# Patient Record
Sex: FEMALE | Race: BLACK OR AFRICAN AMERICAN | NOT HISPANIC OR LATINO
[De-identification: names, ages, dates, MRNs, and addresses within clinical notes are randomized per-mention and may not be internally consistent; named-entity substitution may affect disease eponyms.]

---

## 2019-03-28 ENCOUNTER — TRANSCRIPTION ENCOUNTER (OUTPATIENT)
Age: 49
End: 2019-03-28

## 2020-02-03 ENCOUNTER — INPATIENT (INPATIENT)
Facility: HOSPITAL | Age: 50
LOS: 0 days | Discharge: HOME | End: 2020-02-04
Attending: ORTHOPAEDIC SURGERY | Admitting: ORTHOPAEDIC SURGERY
Payer: MEDICAID

## 2020-02-03 VITALS
DIASTOLIC BLOOD PRESSURE: 86 MMHG | TEMPERATURE: 98 F | SYSTOLIC BLOOD PRESSURE: 155 MMHG | WEIGHT: 220.02 LBS | OXYGEN SATURATION: 98 % | HEIGHT: 69 IN | HEART RATE: 97 BPM | RESPIRATION RATE: 22 BRPM

## 2020-02-03 DIAGNOSIS — Z98.890 OTHER SPECIFIED POSTPROCEDURAL STATES: Chronic | ICD-10-CM

## 2020-02-03 LAB
ANION GAP SERPL CALC-SCNC: 6 MMOL/L — SIGNIFICANT CHANGE UP (ref 5–17)
APTT BLD: 33.4 SEC — SIGNIFICANT CHANGE UP (ref 27.5–36.3)
BUN SERPL-MCNC: 6 MG/DL — LOW (ref 7–23)
CALCIUM SERPL-MCNC: 9 MG/DL — SIGNIFICANT CHANGE UP (ref 8.5–10.1)
CHLORIDE SERPL-SCNC: 107 MMOL/L — SIGNIFICANT CHANGE UP (ref 96–108)
CO2 SERPL-SCNC: 27 MMOL/L — SIGNIFICANT CHANGE UP (ref 22–31)
CREAT SERPL-MCNC: 1.08 MG/DL — SIGNIFICANT CHANGE UP (ref 0.5–1.3)
GLUCOSE SERPL-MCNC: 113 MG/DL — HIGH (ref 70–99)
HCG SERPL-ACNC: <1 MIU/ML — SIGNIFICANT CHANGE UP
HCG UR QL: NEGATIVE — SIGNIFICANT CHANGE UP
HCT VFR BLD CALC: 38 % — SIGNIFICANT CHANGE UP (ref 34.5–45)
HGB BLD-MCNC: 12.7 G/DL — SIGNIFICANT CHANGE UP (ref 11.5–15.5)
INR BLD: 1.03 RATIO — SIGNIFICANT CHANGE UP (ref 0.88–1.16)
MCHC RBC-ENTMCNC: 28.2 PG — SIGNIFICANT CHANGE UP (ref 27–34)
MCHC RBC-ENTMCNC: 33.4 GM/DL — SIGNIFICANT CHANGE UP (ref 32–36)
MCV RBC AUTO: 84.3 FL — SIGNIFICANT CHANGE UP (ref 80–100)
NRBC # BLD: 0 /100 WBCS — SIGNIFICANT CHANGE UP (ref 0–0)
PLATELET # BLD AUTO: 323 K/UL — SIGNIFICANT CHANGE UP (ref 150–400)
POTASSIUM SERPL-MCNC: 4.3 MMOL/L — SIGNIFICANT CHANGE UP (ref 3.5–5.3)
POTASSIUM SERPL-SCNC: 4.3 MMOL/L — SIGNIFICANT CHANGE UP (ref 3.5–5.3)
PROTHROM AB SERPL-ACNC: 11.6 SEC — SIGNIFICANT CHANGE UP (ref 10–12.9)
RBC # BLD: 4.51 M/UL — SIGNIFICANT CHANGE UP (ref 3.8–5.2)
RBC # FLD: 13.6 % — SIGNIFICANT CHANGE UP (ref 10.3–14.5)
SODIUM SERPL-SCNC: 140 MMOL/L — SIGNIFICANT CHANGE UP (ref 135–145)
WBC # BLD: 9.99 K/UL — SIGNIFICANT CHANGE UP (ref 3.8–10.5)
WBC # FLD AUTO: 9.99 K/UL — SIGNIFICANT CHANGE UP (ref 3.8–10.5)

## 2020-02-03 PROCEDURE — 99223 1ST HOSP IP/OBS HIGH 75: CPT

## 2020-02-03 PROCEDURE — 93010 ELECTROCARDIOGRAM REPORT: CPT

## 2020-02-03 PROCEDURE — 73590 X-RAY EXAM OF LOWER LEG: CPT | Mod: 26,LT,77

## 2020-02-03 PROCEDURE — 73630 X-RAY EXAM OF FOOT: CPT | Mod: 26,LT

## 2020-02-03 PROCEDURE — 71045 X-RAY EXAM CHEST 1 VIEW: CPT | Mod: 26

## 2020-02-03 PROCEDURE — 73590 X-RAY EXAM OF LOWER LEG: CPT | Mod: 26,LT

## 2020-02-03 PROCEDURE — 99285 EMERGENCY DEPT VISIT HI MDM: CPT

## 2020-02-03 PROCEDURE — 73700 CT LOWER EXTREMITY W/O DYE: CPT | Mod: 26,LT

## 2020-02-03 PROCEDURE — 73610 X-RAY EXAM OF ANKLE: CPT | Mod: 26,LT

## 2020-02-03 RX ORDER — OXYCODONE AND ACETAMINOPHEN 5; 325 MG/1; MG/1
1 TABLET ORAL ONCE
Refills: 0 | Status: DISCONTINUED | OUTPATIENT
Start: 2020-02-03 | End: 2020-02-03

## 2020-02-03 RX ORDER — MORPHINE SULFATE 50 MG/1
4 CAPSULE, EXTENDED RELEASE ORAL ONCE
Refills: 0 | Status: DISCONTINUED | OUTPATIENT
Start: 2020-02-03 | End: 2020-02-03

## 2020-02-03 RX ORDER — OXYCODONE HYDROCHLORIDE 5 MG/1
5 TABLET ORAL EVERY 4 HOURS
Refills: 0 | Status: DISCONTINUED | OUTPATIENT
Start: 2020-02-03 | End: 2020-02-04

## 2020-02-03 RX ORDER — TRAMADOL HYDROCHLORIDE 50 MG/1
50 TABLET ORAL EVERY 6 HOURS
Refills: 0 | Status: DISCONTINUED | OUTPATIENT
Start: 2020-02-03 | End: 2020-02-04

## 2020-02-03 RX ORDER — OXYCODONE HYDROCHLORIDE 5 MG/1
10 TABLET ORAL EVERY 4 HOURS
Refills: 0 | Status: DISCONTINUED | OUTPATIENT
Start: 2020-02-03 | End: 2020-02-04

## 2020-02-03 RX ORDER — HEPARIN SODIUM 5000 [USP'U]/ML
5000 INJECTION INTRAVENOUS; SUBCUTANEOUS EVERY 8 HOURS
Refills: 0 | Status: DISCONTINUED | OUTPATIENT
Start: 2020-02-03 | End: 2020-02-04

## 2020-02-03 RX ORDER — ACETAMINOPHEN 500 MG
650 TABLET ORAL EVERY 6 HOURS
Refills: 0 | Status: DISCONTINUED | OUTPATIENT
Start: 2020-02-03 | End: 2020-02-04

## 2020-02-03 RX ORDER — HYDROMORPHONE HYDROCHLORIDE 2 MG/ML
1 INJECTION INTRAMUSCULAR; INTRAVENOUS; SUBCUTANEOUS
Refills: 0 | Status: DISCONTINUED | OUTPATIENT
Start: 2020-02-03 | End: 2020-02-04

## 2020-02-03 RX ORDER — SENNA PLUS 8.6 MG/1
2 TABLET ORAL AT BEDTIME
Refills: 0 | Status: DISCONTINUED | OUTPATIENT
Start: 2020-02-03 | End: 2020-02-04

## 2020-02-03 RX ORDER — ONDANSETRON 8 MG/1
4 TABLET, FILM COATED ORAL EVERY 6 HOURS
Refills: 0 | Status: DISCONTINUED | OUTPATIENT
Start: 2020-02-03 | End: 2020-02-04

## 2020-02-03 RX ADMIN — HEPARIN SODIUM 5000 UNIT(S): 5000 INJECTION INTRAVENOUS; SUBCUTANEOUS at 23:01

## 2020-02-03 RX ADMIN — OXYCODONE AND ACETAMINOPHEN 1 TABLET(S): 5; 325 TABLET ORAL at 08:05

## 2020-02-03 RX ADMIN — TRAMADOL HYDROCHLORIDE 50 MILLIGRAM(S): 50 TABLET ORAL at 18:02

## 2020-02-03 RX ADMIN — OXYCODONE HYDROCHLORIDE 10 MILLIGRAM(S): 5 TABLET ORAL at 23:01

## 2020-02-03 RX ADMIN — TRAMADOL HYDROCHLORIDE 50 MILLIGRAM(S): 50 TABLET ORAL at 19:00

## 2020-02-03 RX ADMIN — OXYCODONE AND ACETAMINOPHEN 1 TABLET(S): 5; 325 TABLET ORAL at 10:19

## 2020-02-03 RX ADMIN — Medication 1 TABLET(S): at 16:02

## 2020-02-03 RX ADMIN — MORPHINE SULFATE 4 MILLIGRAM(S): 50 CAPSULE, EXTENDED RELEASE ORAL at 14:04

## 2020-02-03 RX ADMIN — OXYCODONE HYDROCHLORIDE 10 MILLIGRAM(S): 5 TABLET ORAL at 17:00

## 2020-02-03 RX ADMIN — OXYCODONE HYDROCHLORIDE 10 MILLIGRAM(S): 5 TABLET ORAL at 16:02

## 2020-02-03 RX ADMIN — OXYCODONE AND ACETAMINOPHEN 1 TABLET(S): 5; 325 TABLET ORAL at 07:35

## 2020-02-03 RX ADMIN — OXYCODONE AND ACETAMINOPHEN 1 TABLET(S): 5; 325 TABLET ORAL at 10:54

## 2020-02-03 RX ADMIN — SENNA PLUS 2 TABLET(S): 8.6 TABLET ORAL at 23:00

## 2020-02-03 NOTE — ED PROVIDER NOTE - CLINICAL SUMMARY MEDICAL DECISION MAKING FREE TEXT BOX
hx, exam, labs, xray left ankle, xray of left tib/fib, left foot, ct of left lower extremity, ortho eval and placed a splint.

## 2020-02-03 NOTE — ED ADULT TRIAGE NOTE - CHIEF COMPLAINT QUOTE
Pt fell down 3 stairs and injured her left ankle. It is swollen and painful.+ pedal pulses. No other injuries. no AC use

## 2020-02-03 NOTE — ED PROVIDER NOTE - PROGRESS NOTE DETAILS
Ortho is paged. Ortho is notified. ortho is here treating pt now. pt decision to be admitted and ortho is notified and sts she will be admitted to Dr. Duron.

## 2020-02-03 NOTE — CONSULT NOTE ADULT - SUBJECTIVE AND OBJECTIVE BOX
HPI  49F complains of left ankle pain for since mechanical fall down stairs earlier today. Patient denies numbness, paresthesias, headstrike, loss of consciousness, or any other signs/symptoms at this time. Patient is a community ambulator without assistive devices at baseline.    Allergies: NKDA  PMH: Denies  PSH: Elective breast reduction  Social: Denies tobacco use  FH: Noncontributory  Imaging: XR/CT left lower leg - distal tibia-fibula fracture    ROS: Negative for all systems except as noted above in HPI    Physical exam  VS:  Vital Signs Last 24 Hrs  T(C): 36.7 (03 Feb 2020 07:36), Max: 36.7 (03 Feb 2020 06:49)  T(F): 98.1 (03 Feb 2020 07:36), Max: 98.1 (03 Feb 2020 06:49)  HR: 78 (03 Feb 2020 07:36) (78 - 97)  BP: 146/84 (03 Feb 2020 07:36) (146/84 - 155/86)  BP(mean): --  RR: 18 (03 Feb 2020 07:36) (18 - 22)  SpO2: 98% (03 Feb 2020 07:36) (98% - 98%)  Gen: NAD  left LE: Skin intact. Able to SLR. Negative logroll. No erythema/ecchymosis/warmth. No TTP bony prominences at Knee/Foot/Toes.  +EHL/FHL. SILT L3-S1. Capillary refill brisk. Compartments soft and nontender. No pain with passive stretch toes.  Secondary Survey: No TTP bony prominences with full painless AROM at baseline per patient. SILT. Capillary refill brisk. Able to SLR with contralateral leg. No TTP axial spine.    Procedure: Using aseptic technique, 5cc 1% lidocaine injected into intrarticular ankle joint and 5cc 1% lidocaine injected into tibia fracture site. Long leg splint applied. Patient neurovascular intact post-procedure.

## 2020-02-03 NOTE — ED ADULT NURSE NOTE - CHPI ED NUR CONTEXT2
An Zamora RN  08/01/18 [id number] fall

## 2020-02-03 NOTE — CONSULT NOTE ADULT - ASSESSMENT
49F with left distal tibia-fibula fracture    Discussed extensively with patient admission versus outpatient follow up to schedule surgery  At this time, patient elects for outpatient follow up  Surgical intervention - left distal tibia-fibula open reduction and internal fixation  Counseled regarding signs and symptoms of compartment syndrome  NWB affected extremity  Keep splint clean, dry, and intact  Pain control  Will discuss with attending and advise if change 49F with left distal tibia-fibula fracture    Discussed extensively with patient admission versus outpatient follow up to schedule surgery  At this time, patient elects for outpatient follow up  Surgical intervention - left distal tibia-fibula open reduction and internal fixation  Counseled regarding signs and symptoms of compartment syndrome  NWB affected extremity  Keep splint clean, dry, and intact  Pain control  Ortho stable for discharge  Follow up this week with Dr Meyers - call office for appointment  Will discuss with attending and advise if change

## 2020-02-03 NOTE — PHYSICAL THERAPY INITIAL EVALUATION ADULT - CRITERIA FOR SKILLED THERAPEUTIC INTERVENTIONS
anticipated discharge recommendation/predicted duration of therapy intervention/risk reduction/prevention/rehab potential/functional limitations in following categories/impairments found/therapy frequency/to be determined after surgery.

## 2020-02-03 NOTE — ED PROVIDER NOTE - MUSCULOSKELETAL NECK EXAM
no deformity, pain or tenderness. no restriction of movement/supple/trachea midline/no vertebral point tenderness no pain on movement

## 2020-02-03 NOTE — CONSULT NOTE ADULT - ASSESSMENT
49 year old female was admitted for acute traumatic left ankle fracture.     1.	Acute traumatic left ankle fracture with severe pain. Patient at baseline can walk 2-3 blocks and can take 2 flights upstairs without any chest pain or shortness of breath. no current chest pain. no recent cardiac procedures. patient is at low risk for left ankle ORIF. benefits overweigh risks. so can proceed with the surgery. cont current iv dilaudid and oral meds for pain control. watch for any oversedation. DVT ppx.   2.	Added senna for constipation PPx.     Full code.     Thank You for consulting us. we will continue to follow

## 2020-02-03 NOTE — PHYSICAL THERAPY INITIAL EVALUATION ADULT - ACTIVE RANGE OF MOTION EXAMINATION, REHAB EVAL
Right LE Active ROM was WFL (within functional limits)/no Active ROM deficits were identified/L ankle on a cast./deficits as listed below

## 2020-02-03 NOTE — ED ADULT NURSE REASSESSMENT NOTE - NS ED NURSE REASSESS COMMENT FT1
Patient completed x rays of left ankle, evaluated by orthopedic and place in soft cast to left leg below knee to foot with toes exposed, patient tolerated well. NPO reinforced and patient now states pain is 0/10. Sent for ct. scan and will have labs drawn after return to unit.

## 2020-02-03 NOTE — H&P ADULT - NSHPPHYSICALEXAM_GEN_ALL_CORE
Physical exam  VS:  Vital Signs Last 24 Hrs  T(C): 36.7 (03 Feb 2020 07:36), Max: 36.7 (03 Feb 2020 06:49)  T(F): 98.1 (03 Feb 2020 07:36), Max: 98.1 (03 Feb 2020 06:49)  HR: 78 (03 Feb 2020 07:36) (78 - 97)  BP: 146/84 (03 Feb 2020 07:36) (146/84 - 155/86)  BP(mean): --  RR: 18 (03 Feb 2020 07:36) (18 - 22)  SpO2: 98% (03 Feb 2020 07:36) (98% - 98%)  Gen: NAD  left LE: Skin intact. Able to SLR. Negative logroll. No erythema/ecchymosis/warmth. No TTP bony prominences at Knee/Foot/Toes.  +EHL/FHL. SILT L3-S1. Capillary refill brisk. Compartments soft and nontender. No pain with passive stretch toes.  Secondary Survey: No TTP bony prominences with full painless AROM at baseline per patient. SILT. Capillary refill brisk. Able to SLR with contralateral leg. No TTP axial spine.

## 2020-02-03 NOTE — ED PROVIDER NOTE - OBJECTIVE STATEMENT
49 years old female here c/o pain to the medial left ankle missed and step and fell 3 stairs 2 hours ago this morning. Pt denies trauma to the head. loc, neck/back/hips pain, headache, dizziness, blurred visions, light sensitivities, focal/distal weakness or numbness, cough, sob, chest pain, nausea, vomiting, fever, chills, abd pain, dysuria, vaginal spotting or discharge or irregular bowel movement. Pt sts she is not at risk of being pregnant.

## 2020-02-03 NOTE — PHYSICAL THERAPY INITIAL EVALUATION ADULT - STRENGTHENING, PT EVAL
Improve strength in the L LE and be able to perform functional tasks-bed mobility, sitting, standing, transfers and ambulate in a safe manner with or without  assistive device and prevent falls.

## 2020-02-03 NOTE — H&P ADULT - NSHPLABSRESULTS_GEN_ALL_CORE
Procedure: Using aseptic technique, 5cc 1% lidocaine injected into intrarticular ankle joint and 5cc 1% lidocaine injected into tibia fracture site. Long leg splint applied. Patient neurovascular intact post-procedure.

## 2020-02-03 NOTE — H&P ADULT - ASSESSMENT
49F with left distal tibia-fibula fracture    Discussed extensively with patient admission versus outpatient follow up to schedule surgery  At this time, patient elects for inpatient admission for pain control  Surgical intervention - left distal tibia-fibula open reduction and internal fixation  Counseled regarding signs and symptoms of compartment syndrome  NWB affected extremity  Keep splint clean, dry, and intact  Pain control  Will discuss with attending and advise if change

## 2020-02-03 NOTE — PHYSICAL THERAPY INITIAL EVALUATION ADULT - ADDITIONAL COMMENTS
as per patient she lives on a PH with daughter, pt has a driveway after she access two steps + 3 more steps with no hand rails. once inside the house pt has 12 steps with left hand rail to access bedroom. pt has a tub shower. pt was independent during functional mobility. able to drive a car.

## 2020-02-03 NOTE — CONSULT NOTE ADULT - SUBJECTIVE AND OBJECTIVE BOX
HPI: 49 year old healthy female was admitted to ortho service for acute traumatic left ankle fracture. patient stated that 2 hours PTA she was coming downstairs and had bags in hands; she slipped and landed on her left ankle. she did not any bleeding or gross deformity but started having 10/10 left foot and ankle pain.   she denied any LOC or head trauma or dizziness or palpitations.   no recent chest pain/sob/n/v/d/abd pain/fever/dysuria/active gross bleeding/focal deficit.   in the ER patient was diagnosed with left ankle fracture and soft cast was placed. Ortho was consulted and is planning to do ORIF on Thursday.   Medicine service is consulted for pre-op clearance.     ROS : Except perinent positives and negatives as mentioned in HPI, all other review of systems including constitutional, HEENT, CVS, Resp, GI, , MSK, Integumentary, Psychiatry, Neurology, allergic, hematologic/lymphatic are reviewed and found unremarakle at the time of my evaluation.     Allergies    No Known Allergies    Home Medications: none    PMH: None  PSH: Bilateral breast augmentations  Social History: non smoker, no heavy alcohol drinking. full functional at baseline.   FH: no premature Heart problems in the family    Inpatient Medications:       MEDICATIONS  (STANDING):  heparin  Injectable 5000 Unit(s) SubCutaneous every 8 hours  multivitamin 1 Tablet(s) Oral daily  senna 2 Tablet(s) Oral at bedtime    MEDICATIONS  (PRN):  acetaminophen   Tablet .. 650 milliGRAM(s) Oral every 6 hours PRN Temp greater or equal to 38C (100.4F)  bisacodyl 5 milliGRAM(s) Oral daily PRN Constipation  HYDROmorphone  Injectable 1 milliGRAM(s) IV Push every 3 hours PRN breakthrough pain  ondansetron Injectable 4 milliGRAM(s) IV Push every 6 hours PRN Nausea  oxyCODONE    IR 10 milliGRAM(s) Oral every 4 hours PRN Severe Pain (7 - 10)  oxyCODONE    IR 5 milliGRAM(s) Oral every 4 hours PRN Moderate Pain (4 - 6)  traMADol 50 milliGRAM(s) Oral every 6 hours PRN Mild Pain (1 - 3)      Physical exam:   T(C): 36.9 (02-03-20 @ 15:54), Max: 36.9 (02-03-20 @ 14:56)  HR: 75 (02-03-20 @ 15:54) (75 - 97)  BP: 119/70 (02-03-20 @ 15:54) (119/70 - 155/86)  RR: 18 (02-03-20 @ 15:54) (18 - 22)  SpO2: 98% (02-03-20 @ 15:54) (98% - 99%)       General: NAD, conversant  HEENT: Head is atraumatic and normocephalic. Pupils are euqal and reactive to light. no Conjunctival congestion. No abnormal lesions or bleeding from nares. Oral mucosa moist. No Pharyngeal congestion. EAC intact and no drainage noticed from ears  Neck: supple. no JVD. No visible Thyroid enlargement   CVS: S1 S2 normal, RRR, no murmur  Resp: No labored breathing. CTA bilaterally, no wheezing, no crackles   GI: abd soft, non tender. + BS  MSK: Expected ROM in arms and right leg. + soft cast left leg. able to move toes and no cyanosis.   Neurology: Grossly non focal.   Lymphatic: No gross LAP axillary/cervical/inguinal areas  Integumentary: moist, no rash   Psychiatry: Alert, awake. Oriented x 3. Appropriate mood    LABS:                        12.7   9.99  )-----------( 323      ( 03 Feb 2020 11:32 )             38.0             02-03    140  |  107  |  6<L>  ----------------------------<  113<H>  4.3   |  27  |  1.08    Ca    9.0      03 Feb 2020 11:32                PT/INR - ( 03 Feb 2020 11:32 )   PT: 11.6 sec;   INR: 1.03 ratio         PTT - ( 03 Feb 2020 11:32 )  PTT:33.4 sec           Interval Radiology studies: reviewed     < from: Xray Chest 1 View- PORTABLE-Urgent (02.03.20 @ 13:55) >  IMPRESSION: Negative chest.    < from: Xray Tibia + Fibula 2 Views, Left (02.03.20 @ 13:54) >  A splintis applied.    Lower shaft fractures of the tibia and fibula again noted and fairly unchanged alignment.    IMPRESSION: Splinting.      < from: CT Lower Extremity No Cont, Left (02.03.20 @ 12:02) >  IMPRESSION:    Distal tibial and fibular fractures with nondisplaced extension into the tibial plafond.  Mild offset of the tibiotalar joint.  Distal fibular fracture with portion of proximal fracture fragment extending into the lateral tibiotalar joint.

## 2020-02-03 NOTE — PHYSICAL THERAPY INITIAL EVALUATION ADULT - PERTINENT HX OF CURRENT PROBLEM, REHAB EVAL
pt was admitted to [facility] due to s/p fall with Fx of L distal tibia/fibula.

## 2020-02-03 NOTE — H&P ADULT - HISTORY OF PRESENT ILLNESS
49F complains of left ankle pain for since mechanical fall down stairs earlier today. Patient denies numbness, paresthesias, headstrike, loss of consciousness, or any other signs/symptoms at this time. Patient is a community ambulator without assistive devices at baseline.    Allergies: NKDA  PMH: Denies  PSH: Elective breast reduction  Social: Denies tobacco use  FH: Noncontributory  Imaging: XR/CT left lower leg - distal tibia-fibula fracture

## 2020-02-03 NOTE — PHYSICAL THERAPY INITIAL EVALUATION ADULT - GAIT TRAINING, PT EVAL
Pt will independently ambulate 200feet with crutches without loss of balance, by 2 weeks maintaining proper WB on L LE

## 2020-02-03 NOTE — ED ADULT NURSE NOTE - NSIMPLEMENTINTERV_GEN_ALL_ED
Implemented All Fall Risk Interventions:  Bunch to call system. Call bell, personal items and telephone within reach. Instruct patient to call for assistance. Room bathroom lighting operational. Non-slip footwear when patient is off stretcher. Physically safe environment: no spills, clutter or unnecessary equipment. Stretcher in lowest position, wheels locked, appropriate side rails in place. Provide visual cue, wrist band, yellow gown, etc. Monitor gait and stability. Monitor for mental status changes and reorient to person, place, and time. Review medications for side effects contributing to fall risk. Reinforce activity limits and safety measures with patient and family.

## 2020-02-03 NOTE — ED PROVIDER NOTE - CONSTITUTIONAL, MLM
normal... Well appearing, awake, alert, oriented to person, place, time/situation and in no apparent distress. Speaking in clear full sentences no nasal flaring no shoulders retractions no diaphoresis,

## 2020-02-04 ENCOUNTER — TRANSCRIPTION ENCOUNTER (OUTPATIENT)
Age: 50
End: 2020-02-04

## 2020-02-04 VITALS — SYSTOLIC BLOOD PRESSURE: 114 MMHG | DIASTOLIC BLOOD PRESSURE: 65 MMHG | HEART RATE: 71 BPM | OXYGEN SATURATION: 98 %

## 2020-02-04 PROCEDURE — 99232 SBSQ HOSP IP/OBS MODERATE 35: CPT

## 2020-02-04 RX ORDER — ONDANSETRON 8 MG/1
1 TABLET, FILM COATED ORAL
Qty: 10 | Refills: 0
Start: 2020-02-04

## 2020-02-04 RX ORDER — ASPIRIN/CALCIUM CARB/MAGNESIUM 324 MG
1 TABLET ORAL
Qty: 60 | Refills: 0
Start: 2020-02-04 | End: 2020-03-04

## 2020-02-04 RX ORDER — DOCUSATE SODIUM 100 MG
2 CAPSULE ORAL
Qty: 60 | Refills: 0
Start: 2020-02-04

## 2020-02-04 RX ADMIN — HYDROMORPHONE HYDROCHLORIDE 1 MILLIGRAM(S): 2 INJECTION INTRAMUSCULAR; INTRAVENOUS; SUBCUTANEOUS at 03:01

## 2020-02-04 RX ADMIN — Medication 1 TABLET(S): at 11:35

## 2020-02-04 RX ADMIN — OXYCODONE HYDROCHLORIDE 10 MILLIGRAM(S): 5 TABLET ORAL at 13:57

## 2020-02-04 RX ADMIN — OXYCODONE HYDROCHLORIDE 10 MILLIGRAM(S): 5 TABLET ORAL at 09:15

## 2020-02-04 RX ADMIN — HEPARIN SODIUM 5000 UNIT(S): 5000 INJECTION INTRAVENOUS; SUBCUTANEOUS at 06:35

## 2020-02-04 RX ADMIN — OXYCODONE HYDROCHLORIDE 10 MILLIGRAM(S): 5 TABLET ORAL at 00:01

## 2020-02-04 RX ADMIN — OXYCODONE HYDROCHLORIDE 10 MILLIGRAM(S): 5 TABLET ORAL at 08:15

## 2020-02-04 RX ADMIN — HYDROMORPHONE HYDROCHLORIDE 1 MILLIGRAM(S): 2 INJECTION INTRAMUSCULAR; INTRAVENOUS; SUBCUTANEOUS at 03:15

## 2020-02-04 RX ADMIN — OXYCODONE HYDROCHLORIDE 10 MILLIGRAM(S): 5 TABLET ORAL at 14:37

## 2020-02-04 RX ADMIN — HEPARIN SODIUM 5000 UNIT(S): 5000 INJECTION INTRAVENOUS; SUBCUTANEOUS at 13:42

## 2020-02-04 RX ADMIN — ONDANSETRON 4 MILLIGRAM(S): 8 TABLET, FILM COATED ORAL at 06:35

## 2020-02-04 NOTE — PROGRESS NOTE ADULT - ASSESSMENT
49 year old female was admitted for acute traumatic left ankle fracture.     1.	Acute traumatic left ankle fracture with severe pain. Patient at baseline can walk 2-3 blocks, and can take 2 flights upstairs without any chest pain or shortness of breath. no current chest pain, no recent cardiac procedures. patient is at low risk for left ankle ORIF. benefits overweigh risks. so can proceed with the surgery. cont current oral meds for pain control. watch for any oversedation. Pt advised PMD follow up on discharge, postop    2.	Added senna for constipation PPx.     dispo: pt to be discharged today with OR planned for monday

## 2020-02-04 NOTE — DISCHARGE NOTE PROVIDER - CARE PROVIDER_API CALL
Félix Meyers (DO)  Orthopaedic Surgery  [address] [place] [address]  Phone: [phone]  Fax: [phone]  Follow Up Time: Félix Meyers (DO)  Orthopaedic Surgery  [address] [place] [address]  Phone: [phone]  Fax: [phone]  Follow Up Time: 1-3 days

## 2020-02-04 NOTE — DISCHARGE NOTE NURSING/CASE MANAGEMENT/SOCIAL WORK - PATIENT PORTAL LINK FT
You can access the FollowMyHealth Patient Portal offered by [facility] by registering at the following website: [email or web address]. By joining [facility]’s FollowMyHealth portal, you will also be able to view your health information using other applications (apps) compatible with our system.

## 2020-02-04 NOTE — DISCHARGE NOTE PROVIDER - NSDCMRMEDTOKEN_GEN_ALL_CORE_FT
Colace 100 mg oral capsule: 2 cap(s) orally 2 times a day, As Needed -for constipation  Ecotrin 325 mg oral delayed release tablet: 1 tab(s) orally 2 times a day for blood clot prevention  oxyCODONE-acetaminophen 5 mg-325 mg oral tablet: 1 tab(s) orally every 4 hours, As Needed -for severe pain MDD:6 tabs per day  Zofran ODT 4 mg oral tablet, disintegratin tab(s) orally every 8 hours, As Needed -for nausea

## 2020-02-04 NOTE — PROGRESS NOTE ADULT - SUBJECTIVE AND OBJECTIVE BOX
Patient seen and examined. Pain controlled. Patient complains of nausea.    Physical exam  VS:  Vital Signs Last 24 Hrs  T(C): 36.7 (04 Feb 2020 06:00), Max: 37 (03 Feb 2020 23:30)  T(F): 98.1 (04 Feb 2020 06:00), Max: 98.6 (03 Feb 2020 23:30)  HR: 68 (04 Feb 2020 06:00) (65 - 86)  BP: 120/61 (04 Feb 2020 06:00) (119/70 - 146/84)  BP(mean): --  RR: 16 (04 Feb 2020 06:00) (16 - 18)  SpO2: 97% (04 Feb 2020 06:00) (96% - 99%)  Gen: NAD  Left LE: Splint intact. +EHL/FHL. SILT toes. Capillary refill brisk. No pain with passive stretch. No wrinkle present anterior ankle with skin pinch.      49F with left distal tibia-fibula fracture    Discussed extensively with patient inpatient versus outpatient follow up to schedule surgery  Secondary to swelling that necessitates delay of surgery and scheduling with OR, recommend discharge with outpatient follow up to schedule surgery  Surgical intervention - left distal tibia-fibula open reduction and internal fixation  Counseled regarding signs and symptoms of compartment syndrome  NWB affected extremity  Keep splint clean, dry, and intact  Pain control  Discussed with Dr Meyers, who agrees to above

## 2020-02-04 NOTE — DISCHARGE NOTE PROVIDER - NSDCCPCAREPLAN_GEN_ALL_CORE_FT
PRINCIPAL DISCHARGE DIAGNOSIS  Diagnosis: Fracture of ankle, closed, left, initial encounter  Assessment and Plan of Treatment:

## 2020-02-04 NOTE — DISCHARGE NOTE NURSING/CASE MANAGEMENT/SOCIAL WORK - NSDCPNINST_GEN_ALL_CORE
Take your medications exactly as prescribed. Having your pain under control will help increase activity, improve deep breathing and coughing and prevent complications like pneumonia and blood clots in your legs. Some of the common side effects of pain medications are nausea, vomiting, itching, rash and upset stomach. Contact your doctor if you develop these or any other unusual systoms. Eat a diet rich in fiber and drink plenty of oral fluids. Use other pain management methods like, cold and warm applications, elevation of affected body part, listening to music, watching TV, yoga, etc. [email or web address]

## 2020-02-04 NOTE — DISCHARGE NOTE PROVIDER - NSDCFUADDINST_GEN_ALL_CORE_FT
1.	Pain Control  2.	Non-weight bearing affected extremity, with assistive devices as needed  3.	DVT Prophylaxis  4.	PT as needed  5.	Follow up with Dr. Meyers as Outpatient this week. Call Office For Appointment.  6.	Ice/Elevate affected area as much as possible  7.	Keep Splint clean and dry 1.	Pain Control  2.	Non-weight bearing affected extremity, with assistive devices as needed  3.	DVT Prophylaxis  4.	PT as needed  5.	Follow up with Dr. Meyers as Outpatient tomorrow afternoon. Call Office For Appointment.  6.	Ice/Elevate affected area as much as possible  7.	Keep Splint clean and dry

## 2020-02-04 NOTE — DISCHARGE NOTE PROVIDER - HOSPITAL COURSE
The patient is a 49F who presented with a left distal tibia-fibula fracture. The patient was placed in a splint and then admitted for pain control and close extremity monitoring. Surgery was delayed secondary to swelling of the surgical site that necessitated delay of surgery. Patient was discharged for outpatient follow up to schedule surgery.

## 2020-02-07 ENCOUNTER — OUTPATIENT (OUTPATIENT)
Dept: OUTPATIENT SERVICES | Facility: HOSPITAL | Age: 50
LOS: 1 days | Discharge: HOME | End: 2020-02-07

## 2020-02-07 VITALS
SYSTOLIC BLOOD PRESSURE: 113 MMHG | TEMPERATURE: 98 F | DIASTOLIC BLOOD PRESSURE: 72 MMHG | RESPIRATION RATE: 18 BRPM | HEIGHT: 69 IN | WEIGHT: 220.46 LBS | HEART RATE: 69 BPM | OXYGEN SATURATION: 96 %

## 2020-02-07 DIAGNOSIS — Z01.818 ENCOUNTER FOR OTHER PREPROCEDURAL EXAMINATION: ICD-10-CM

## 2020-02-07 DIAGNOSIS — S82.392A OTHER FRACTURE OF LOWER END OF LEFT TIBIA, INITIAL ENCOUNTER FOR CLOSED FRACTURE: ICD-10-CM

## 2020-02-07 DIAGNOSIS — Z98.890 OTHER SPECIFIED POSTPROCEDURAL STATES: Chronic | ICD-10-CM

## 2020-02-07 PROBLEM — Z78.9 OTHER SPECIFIED HEALTH STATUS: Chronic | Status: ACTIVE | Noted: 2020-02-03

## 2020-02-07 NOTE — H&P PST ADULT - NSANTHOSAYNRD_GEN_A_CORE
No. MATHEUS screening performed.  STOP BANG Legend: 0-2 = LOW Risk; 3-4 = INTERMEDIATE Risk; 5-8 = HIGH Risk

## 2020-02-07 NOTE — H&P PST ADULT - HISTORY OF PRESENT ILLNESS
49 year old female with no significant past medical history fell at home on 2/3/20 and fractured her left ankle.  She is scheduled for open reduction internal fixation of the left ankle on 2/10/2020.

## 2020-02-07 NOTE — H&P PST ADULT - NSICDXPROBLEM_GEN_ALL_CORE_FT
PROBLEM DIAGNOSES  Problem: Other fracture of lower end of left tibia, initial encounter for closed fracture  Assessment and Plan: open reduction internal fixation left ankle distial tibia fracture on 2/10/20

## 2020-02-07 NOTE — H&P PST ADULT - ASSESSMENT
49 year old female with no significant past medical history fell at home on 2/3/20 and fractured her left ankle.  She is scheduled for open reduction internal fixation of the left ankle on 2/10/2020.     CAPRINI SCORE [CLOT]    AGE RELATED RISK FACTORS                                                       MOBILITY RELATED FACTORS  [x ] Age 41-60 years                                            (1 Point)                  [ ] Bed rest                                                        (1 Point)  [ ] Age: 61-74 years                                           (2 Points)                 [ ] Plaster cast                                                   (2 Points)  [ ] Age= 75 years                                              (3 Points)                 [ ] Bed bound for more than 72 hours                 (2 Points)    DISEASE RELATED RISK FACTORS                                               GENDER SPECIFIC FACTORS  [x ] Edema in the lower extremities                       (1 Point)                  [ ] Pregnancy                                                     (1 Point)  [ ] Varicose veins                                               (1 Point)                  [ ] Post-partum < 6 weeks                                   (1 Point)             [ ] BMI > 25 Kg/m2                                            (1 Point)                  [ ] Hormonal therapy  or oral contraception          (1 Point)                 [ ] Sepsis (in the previous month)                        (1 Point)                  [ ] History of pregnancy complications                 (1 point)  [ ] Pneumonia or serious lung disease                                               [ ] Unexplained or recurrent                     (1 Point)           (in the previous month)                               (1 Point)  [ ] Abnormal pulmonary function test                     (1 Point)                 SURGERY RELATED RISK FACTORS  [ ] Acute myocardial infarction                              (1 Point)                 [ ]  Section                                             (1 Point)  [ ] Congestive heart failure (in the previous month)  (1 Point)               [ ] Minor surgery                                                  (1 Point)   [ ] Inflammatory bowel disease                             (1 Point)                 [ ] Arthroscopic surgery                                        (2 Points)  [ ] Central venous access                                      (2 Points)                [x ] General surgery lasting more than 45 minutes   (2 Points)       [ ] Stroke (in the previous month)                          (5 Points)               [ ] Elective arthroplasty                                         (5 Points)                                                                                                                                               HEMATOLOGY RELATED FACTORS                                                 TRAUMA RELATED RISK FACTORS  [ ] Prior episodes of VTE                                     (3 Points)                [ ] Fracture of the hip, pelvis, or leg                       (5 Points)  [ ] Positive family history for VTE                         (3 Points)                 [ ] Acute spinal cord injury (in the previous month)  (5 Points)  [ ] Prothrombin 44043 A                                     (3 Points)                 [ ] Paralysis  (less than 1 month)                             (5 Points)  [ ] Factor V Leiden                                             (3 Points)                  [ ] Multiple Trauma within 1 month                        (5 Points)  [ ] Lupus anticoagulants                                     (3 Points)                                                           [ ] Anticardiolipin antibodies                               (3 Points)                                                       [ ] High homocysteine in the blood                      (3 Points)                                             [ ] Other congenital or acquired thrombophilia      (3 Points)                                                [ ] Heparin induced thrombocytopenia                  (3 Points)                                          Total Score [      4  ]    Caprini Score 0 - 2:  Low Risk, No VTE Prophylaxis required for most patients, encourage ambulation  Caprini Score 3 - 6:  At Risk, pharmacologic VTE prophylaxis is indicated for most patients (in the absence of a contraindication)  Caprini Score Greater than or = 7:  High Risk, pharmacologic VTE prophylaxis is indicated for most patients (in the absence of a contraindication)

## 2020-02-09 ENCOUNTER — TRANSCRIPTION ENCOUNTER (OUTPATIENT)
Age: 50
End: 2020-02-09

## 2020-02-10 ENCOUNTER — INPATIENT (INPATIENT)
Facility: HOSPITAL | Age: 50
LOS: 0 days | Discharge: HOME | End: 2020-02-11
Attending: ORTHOPAEDIC SURGERY | Admitting: ORTHOPAEDIC SURGERY

## 2020-02-10 ENCOUNTER — TRANSCRIPTION ENCOUNTER (OUTPATIENT)
Age: 50
End: 2020-02-10

## 2020-02-10 VITALS
DIASTOLIC BLOOD PRESSURE: 80 MMHG | HEIGHT: 69 IN | HEART RATE: 80 BPM | TEMPERATURE: 98 F | WEIGHT: 220.46 LBS | SYSTOLIC BLOOD PRESSURE: 129 MMHG | RESPIRATION RATE: 14 BRPM
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
## 2020-02-10 DIAGNOSIS — S82.892A OTHER FRACTURE OF LEFT LOWER LEG, INITIAL ENCOUNTER FOR CLOSED FRACTURE: ICD-10-CM

## 2020-02-10 DIAGNOSIS — K59.00 CONSTIPATION, UNSPECIFIED: ICD-10-CM

## 2020-02-10 DIAGNOSIS — Z98.890 OTHER SPECIFIED POSTPROCEDURAL STATES: Chronic | ICD-10-CM
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
## 2020-02-10 RX ORDER — CEFAZOLIN SODIUM 1 G
2000 VIAL (EA) INJECTION EVERY 8 HOURS
Refills: 0 | Status: COMPLETED | OUTPATIENT
Start: 2020-02-10 | End: 2020-02-11

## 2020-02-10 RX ORDER — HYDROMORPHONE HYDROCHLORIDE 2 MG/ML
0.5 INJECTION INTRAMUSCULAR; INTRAVENOUS; SUBCUTANEOUS EVERY 6 HOURS
Refills: 0 | Status: DISCONTINUED | OUTPATIENT
Start: 2020-02-10 | End: 2020-02-11

## 2020-02-10 RX ORDER — OXYCODONE HYDROCHLORIDE 5 MG/1
10 TABLET ORAL EVERY 4 HOURS
Refills: 0 | Status: DISCONTINUED | OUTPATIENT
Start: 2020-02-10 | End: 2020-02-11

## 2020-02-10 RX ORDER — ASPIRIN/CALCIUM CARB/MAGNESIUM 324 MG
1 TABLET ORAL
Qty: 60 | Refills: 0
Start: 2020-02-10 | End: 2020-03-10

## 2020-02-10 RX ORDER — DOCUSATE SODIUM 100 MG
1 CAPSULE ORAL
Qty: 14 | Refills: 0
Start: 2020-02-10 | End: 2020-02-16

## 2020-02-10 RX ORDER — ACETAMINOPHEN 500 MG
1000 TABLET ORAL ONCE
Refills: 0 | Status: COMPLETED | OUTPATIENT
Start: 2020-02-10 | End: 2020-02-10

## 2020-02-10 RX ORDER — BENZOCAINE AND MENTHOL 5; 1 G/100ML; G/100ML
1 LIQUID ORAL THREE TIMES A DAY
Refills: 0 | Status: DISCONTINUED | OUTPATIENT
Start: 2020-02-10 | End: 2020-02-11

## 2020-02-10 RX ORDER — ASPIRIN/CALCIUM CARB/MAGNESIUM 324 MG
325 TABLET ORAL
Refills: 0 | Status: DISCONTINUED | OUTPATIENT
Start: 2020-02-10 | End: 2020-02-11

## 2020-02-10 RX ORDER — CYCLOBENZAPRINE HYDROCHLORIDE 10 MG/1
5 TABLET, FILM COATED ORAL THREE TIMES A DAY
Refills: 0 | Status: DISCONTINUED | OUTPATIENT
Start: 2020-02-10 | End: 2020-02-11

## 2020-02-10 RX ORDER — SODIUM CHLORIDE 9 MG/ML
1000 INJECTION, SOLUTION INTRAVENOUS
Refills: 0 | Status: DISCONTINUED | OUTPATIENT
Start: 2020-02-10 | End: 2020-02-11

## 2020-02-10 RX ORDER — ACETAMINOPHEN 500 MG
1000 TABLET ORAL ONCE
Refills: 0 | Status: COMPLETED | OUTPATIENT
Start: 2020-02-11 | End: 2020-02-11

## 2020-02-10 RX ORDER — HYDROMORPHONE HYDROCHLORIDE 2 MG/ML
1 INJECTION INTRAMUSCULAR; INTRAVENOUS; SUBCUTANEOUS
Refills: 0 | Status: DISCONTINUED | OUTPATIENT
Start: 2020-02-10 | End: 2020-02-10

## 2020-02-10 RX ORDER — HYDROMORPHONE HYDROCHLORIDE 2 MG/ML
0.5 INJECTION INTRAMUSCULAR; INTRAVENOUS; SUBCUTANEOUS
Refills: 0 | Status: DISCONTINUED | OUTPATIENT
Start: 2020-02-10 | End: 2020-02-10

## 2020-02-10 RX ORDER — LANOLIN ALCOHOL/MO/W.PET/CERES
3 CREAM (GRAM) TOPICAL AT BEDTIME
Refills: 0 | Status: DISCONTINUED | OUTPATIENT
Start: 2020-02-10 | End: 2020-02-11

## 2020-02-10 RX ORDER — FERROUS SULFATE 325(65) MG
325 TABLET ORAL
Refills: 0 | Status: DISCONTINUED | OUTPATIENT
Start: 2020-02-10 | End: 2020-02-11

## 2020-02-10 RX ORDER — SODIUM CHLORIDE 9 MG/ML
3 INJECTION INTRAMUSCULAR; INTRAVENOUS; SUBCUTANEOUS EVERY 8 HOURS
Refills: 0 | Status: DISCONTINUED | OUTPATIENT
Start: 2020-02-10 | End: 2020-02-10

## 2020-02-10 RX ORDER — OXYCODONE HYDROCHLORIDE 5 MG/1
5 TABLET ORAL EVERY 4 HOURS
Refills: 0 | Status: DISCONTINUED | OUTPATIENT
Start: 2020-02-10 | End: 2020-02-11

## 2020-02-10 RX ORDER — KETOROLAC TROMETHAMINE 30 MG/ML
15 SYRINGE (ML) INJECTION ONCE
Refills: 0 | Status: DISCONTINUED | OUTPATIENT
Start: 2020-02-10 | End: 2020-02-10

## 2020-02-10 RX ORDER — ONDANSETRON 8 MG/1
4 TABLET, FILM COATED ORAL ONCE
Refills: 0 | Status: COMPLETED | OUTPATIENT
Start: 2020-02-10 | End: 2020-02-10

## 2020-02-10 RX ORDER — FOLIC ACID 0.8 MG
1 TABLET ORAL DAILY
Refills: 0 | Status: DISCONTINUED | OUTPATIENT
Start: 2020-02-10 | End: 2020-02-11

## 2020-02-10 RX ORDER — SODIUM CHLORIDE 9 MG/ML
1000 INJECTION, SOLUTION INTRAVENOUS
Refills: 0 | Status: DISCONTINUED | OUTPATIENT
Start: 2020-02-10 | End: 2020-02-10

## 2020-02-10 RX ORDER — ONDANSETRON 8 MG/1
1 TABLET, FILM COATED ORAL
Qty: 21 | Refills: 0
Start: 2020-02-10 | End: 2020-02-16

## 2020-02-10 RX ORDER — TRAMADOL HYDROCHLORIDE 50 MG/1
25 TABLET ORAL EVERY 6 HOURS
Refills: 0 | Status: DISCONTINUED | OUTPATIENT
Start: 2020-02-10 | End: 2020-02-11

## 2020-02-10 RX ORDER — DIPHENHYDRAMINE HCL 50 MG
25 CAPSULE ORAL EVERY 4 HOURS
Refills: 0 | Status: DISCONTINUED | OUTPATIENT
Start: 2020-02-10 | End: 2020-02-11

## 2020-02-10 RX ORDER — ACETAMINOPHEN 500 MG
650 TABLET ORAL EVERY 6 HOURS
Refills: 0 | Status: DISCONTINUED | OUTPATIENT
Start: 2020-02-10 | End: 2020-02-10

## 2020-02-10 RX ORDER — ASCORBIC ACID 60 MG
500 TABLET,CHEWABLE ORAL
Refills: 0 | Status: DISCONTINUED | OUTPATIENT
Start: 2020-02-10 | End: 2020-02-11

## 2020-02-10 RX ADMIN — ONDANSETRON 4 MILLIGRAM(S): 8 TABLET, FILM COATED ORAL at 12:20

## 2020-02-10 RX ADMIN — HYDROMORPHONE HYDROCHLORIDE 1 MILLIGRAM(S): 2 INJECTION INTRAMUSCULAR; INTRAVENOUS; SUBCUTANEOUS at 12:00

## 2020-02-10 RX ADMIN — Medication 400 MILLIGRAM(S): at 11:30

## 2020-02-10 RX ADMIN — HYDROMORPHONE HYDROCHLORIDE 0.5 MILLIGRAM(S): 2 INJECTION INTRAMUSCULAR; INTRAVENOUS; SUBCUTANEOUS at 20:05

## 2020-02-10 RX ADMIN — HYDROMORPHONE HYDROCHLORIDE 1 MILLIGRAM(S): 2 INJECTION INTRAMUSCULAR; INTRAVENOUS; SUBCUTANEOUS at 11:45

## 2020-02-10 RX ADMIN — HYDROMORPHONE HYDROCHLORIDE 1 MILLIGRAM(S): 2 INJECTION INTRAMUSCULAR; INTRAVENOUS; SUBCUTANEOUS at 14:15

## 2020-02-10 RX ADMIN — HYDROMORPHONE HYDROCHLORIDE 0.5 MILLIGRAM(S): 2 INJECTION INTRAMUSCULAR; INTRAVENOUS; SUBCUTANEOUS at 12:00

## 2020-02-10 RX ADMIN — OXYCODONE HYDROCHLORIDE 10 MILLIGRAM(S): 5 TABLET ORAL at 18:03

## 2020-02-10 RX ADMIN — Medication 1 MILLIGRAM(S): at 17:14

## 2020-02-10 RX ADMIN — SODIUM CHLORIDE 100 MILLILITER(S): 9 INJECTION, SOLUTION INTRAVENOUS at 11:13

## 2020-02-10 RX ADMIN — HYDROMORPHONE HYDROCHLORIDE 0.5 MILLIGRAM(S): 2 INJECTION INTRAMUSCULAR; INTRAVENOUS; SUBCUTANEOUS at 19:48

## 2020-02-10 RX ADMIN — HYDROMORPHONE HYDROCHLORIDE 1 MILLIGRAM(S): 2 INJECTION INTRAMUSCULAR; INTRAVENOUS; SUBCUTANEOUS at 14:00

## 2020-02-10 RX ADMIN — Medication 1000 MILLIGRAM(S): at 11:45

## 2020-02-10 RX ADMIN — Medication 100 MILLIGRAM(S): at 19:11

## 2020-02-10 RX ADMIN — Medication 3 MILLIGRAM(S): at 23:48

## 2020-02-10 RX ADMIN — Medication 400 MILLIGRAM(S): at 22:00

## 2020-02-10 RX ADMIN — HYDROMORPHONE HYDROCHLORIDE 0.5 MILLIGRAM(S): 2 INJECTION INTRAMUSCULAR; INTRAVENOUS; SUBCUTANEOUS at 12:15

## 2020-02-10 RX ADMIN — Medication 1000 MILLIGRAM(S): at 22:30

## 2020-02-10 RX ADMIN — HYDROMORPHONE HYDROCHLORIDE 1 MILLIGRAM(S): 2 INJECTION INTRAMUSCULAR; INTRAVENOUS; SUBCUTANEOUS at 11:30

## 2020-02-10 RX ADMIN — OXYCODONE HYDROCHLORIDE 10 MILLIGRAM(S): 5 TABLET ORAL at 17:12

## 2020-02-10 RX ADMIN — Medication 15 MILLIGRAM(S): at 14:45

## 2020-02-10 RX ADMIN — Medication 325 MILLIGRAM(S): at 17:14

## 2020-02-10 RX ADMIN — Medication 500 MILLIGRAM(S): at 17:14

## 2020-02-10 RX ADMIN — OXYCODONE HYDROCHLORIDE 10 MILLIGRAM(S): 5 TABLET ORAL at 23:46

## 2020-02-10 RX ADMIN — Medication 15 MILLIGRAM(S): at 14:35

## 2020-02-10 RX ADMIN — CYCLOBENZAPRINE HYDROCHLORIDE 5 MILLIGRAM(S): 10 TABLET, FILM COATED ORAL at 17:12

## 2020-02-10 NOTE — DISCHARGE NOTE PROVIDER - NSDCFUADDINST_GEN_ALL_CORE_FT
1. Pain Control as needed  2. Non-weight bearing on affected extremity, with assistive devices as needed  3. DVT Prophylaxis with Ecotrin 325 BID for 30 days  4. PT as needed  5. Follow up with Dr. Meyers as Outpatient in 10-14 Days after Discharge from the Hospital or Rehab. Call Office For Appointment.  6. Staples/Sutures to be removed Post-Op Day 14, and repeat x-rays  7. Ice/Elevate affected area as needed  8. Keep Dressing/Splint clean and dry 1. Pain Control as needed  2. Non-weight bearing on affected extremity, with assistive devices as needed  3. DVT Prophylaxis with Ecotrin 325 BID for 30 days after surgery  4. PT as needed  5. Follow up with Dr. Meyers as Outpatient in 10-14 Days after Discharge from the Hospital or Rehab. Call Office For Appointment.  6. Staples/Sutures to be removed Post-Op Day 14, and repeat x-rays  7. Ice/Elevate affected area as needed  8. Keep Dressing/Splint clean and dry 1. Pain Control as needed  2. Non-weight bearing on left lower extremity in trilam splint, may use assistive devices for ambulation as needed  3. DVT Prophylaxis with Ecotrin 325 BID for 30 days after surgery  4. PT as needed  5. Follow up with Dr. Meyers as Outpatient in 10-14 Days after Discharge from the Hospital or Rehab. Call Office For Appointment.  6. Staples/Sutures to be removed Post-Op Day 14, and repeat x-rays  7. Ice/Elevate affected area as needed  8. Keep Dressing/Splint clean and dry

## 2020-02-10 NOTE — BRIEF OPERATIVE NOTE - NSICDXBRIEFPREOP_GEN_ALL_CORE_FT
PRE-OP DIAGNOSIS:  Other closed fracture of distal end of left tibia 10-Feb-2020 11:02:52  Shadi Ruiz

## 2020-02-10 NOTE — PHYSICAL THERAPY INITIAL EVALUATION ADULT - ACTIVE RANGE OF MOTION EXAMINATION, REHAB EVAL
Right LE Active ROM was WFL (within functional limits)/bilateral upper extremity Active ROM was WFL (within functional limits)/L knee and hip WFL; L ankle not assessed due to cast

## 2020-02-10 NOTE — PHYSICAL THERAPY INITIAL EVALUATION ADULT - GENERAL OBSERVATIONS, REHAB EVAL
Pt encountered supine in bed, +pneumatic TEDs, +IV (removed prior to ambulation), +C/D/I cast to LLE, 5/10 pain, NAD.

## 2020-02-10 NOTE — PHYSICAL THERAPY INITIAL EVALUATION ADULT - CRITERIA FOR SKILLED THERAPEUTIC INTERVENTIONS
impairments found/therapy frequency/anticipated equipment needs at discharge/risk reduction/prevention/predicted duration of therapy intervention/anticipated discharge recommendation/functional limitations in following categories/rehab potential

## 2020-02-10 NOTE — PROGRESS NOTE ADULT - ASSESSMENT
Assessment:  49y Female s/p L distal tib/fib ORIF POD #0    -Pain control  -DVT ppx:  BID x 30days  -NWB LLE in trilam splint   -Continue perioperative abx  -OOB with assistance as needed  -PT/OT  -Ice and elevate  -Encourage incentive spirometry  -DC planning: Home tomorrow  -Will discuss with attending and will advise if plan changes.

## 2020-02-10 NOTE — OCCUPATIONAL THERAPY INITIAL EVALUATION ADULT - TRANSFER TRAINING, PT EVAL
Pt will perform safe functional transfers with use of crutches with Modified [place] by the end of 1 week.

## 2020-02-10 NOTE — DISCHARGE NOTE PROVIDER - CARE PROVIDER_API CALL
Félix Meyers (DO)  Orthopaedic Surgery  [address] [place] [address]  Phone: [phone]  Fax: [phone]  Follow Up Time:

## 2020-02-10 NOTE — PROGRESS NOTE ADULT - SUBJECTIVE AND OBJECTIVE BOX
Ortho Postoperative Note     Patient seen and examined at bedside, resting comfortably. Pain well controlled. Denies headache, lightheadedness, dizziness, chest pain, dyspnea, n/v, numbness/tingling. No complaints at this time.     VITAL SIGNS  T(C): 36.5 (02-10-20 @ 15:30), Max: 36.9 (02-10-20 @ 07:49)  HR: 78 (02-10-20 @ 15:30) (76 - 83)  BP: 127/71 (02-10-20 @ 15:30) (127/71 - 151/95)  RR: 16 (02-10-20 @ 15:30) (11 - 17)  SpO2: 98% (02-10-20 @ 15:30) (94% - 99%)    PHYSICAL EXAM  General: NAD, Awake and Alert    LLE:  Splint c/d/i  SCDs in place  L2-S1 SILT  Wiggles all toes   WWP, + capillary refill  + DP/PT pulses  Compartments soft and compressible  Calves nontender

## 2020-02-10 NOTE — OCCUPATIONAL THERAPY INITIAL EVALUATION ADULT - BALANCE TRAINING, PT EVAL
Pt will improve standing balance to Good to facilitate safe functional transfers by the end of 2 weeks.

## 2020-02-10 NOTE — DISCHARGE NOTE PROVIDER - NSDCCPCAREPLAN_GEN_ALL_CORE_FT
PRINCIPAL DISCHARGE DIAGNOSIS  Diagnosis: Fracture of tibia, distal, left, closed  Assessment and Plan of Treatment:       SECONDARY DISCHARGE DIAGNOSES  Diagnosis: Closed fracture of left distal fibula  Assessment and Plan of Treatment:

## 2020-02-10 NOTE — OCCUPATIONAL THERAPY INITIAL EVALUATION ADULT - GENERAL OBSERVATIONS, REHAB EVAL
OT evaluation completed. Encountered Pt semi supine in bed, NAD, daughter at bedside with IV intact to R forearm with soft cast to LLE s/p ORIF L ankle, distal tibia fracture POD 0 + NWB LLE. PT was present for duration for OT evaluation.

## 2020-02-10 NOTE — OCCUPATIONAL THERAPY INITIAL EVALUATION ADULT - RANGE OF MOTION EXAMINATION, LOWER EXTREMITY
Right LE Active ROM was WFL   (within functional limits)/LLE: hip WFL, unable to assess knee and ankle ROM secondary to soft cast

## 2020-02-10 NOTE — PATIENT PROFILE ADULT - FUNCTIONAL SCREEN CURRENT LEVEL: COMMUNICATION, MLM
How Severe Is Your Skin Lesion?: mild
Have Your Skin Lesions Been Treated?: not been treated
Is This A New Presentation, Or A Follow-Up?: Skin Lesions
Additional History: Pt would like to discuss removal
0 = understands/communicates without difficulty

## 2020-02-10 NOTE — OCCUPATIONAL THERAPY INITIAL EVALUATION ADULT - ADDITIONAL COMMENTS
Pt reports she lives with her daughter in a private house with 1 threshold and 3 steps to enter, no hand rails. Pt's bedroom and bathroom are on the first floor. Pt reports she was independent with ADL's and functional transfers/ambulation. Bathroom contains a tub shower, with a regular height toilet.

## 2020-02-10 NOTE — PHYSICAL THERAPY INITIAL EVALUATION ADULT - GAIT TRAINING, PT EVAL
Patient will ambulate 500 feet with axillary crutches independently for community ambulation in 2-3 days. Patient will ascend/descend 4 steps with bilateral axillary crutches independently in 2-3 days to safely navigate home environment.

## 2020-02-10 NOTE — PHYSICAL THERAPY INITIAL EVALUATION ADULT - STRENGTHENING, PT EVAL
Patient will improve strength in LLE to 5/5 6-8 weeks to improve overall functional mobility including gait, transfers, bed mobility and decrease risk of falls.

## 2020-02-10 NOTE — PHYSICAL THERAPY INITIAL EVALUATION ADULT - ADDITIONAL COMMENTS
As per PT Evaluation 2/3/20 and confirmed with pt. Patient lives on a private house with daughter c 1 threshold step + 3 steps with no hand rails to enter home. Once inside the house pt has 12 steps with left hand rail to access bedroom, however states she will be on the 1st floor post discharge home. pt has a tub shower combo in bathroom, states 3:1 commode will fit over toilet. Prior to accident, pt states she was independent without the use of an assistive device.

## 2020-02-10 NOTE — DISCHARGE NOTE PROVIDER - HOSPITAL COURSE
The patient is a 49 year old Female status post open reduction surgical fixation of a distal tib/fib fracture. The patient was medically optimized for the previously mentioned surgical procedure. The patient was taken to the operating room on date mentioned above. Prophylactic antibiotics were started before the procedure and continued for 24 hours.  There were no complications during the procedure and patient tolerated the procedure well.  The patient was transferred to recovery room in stable condition and subsequently to surgical floor.  Patient was placed on Aspirin 325 BID for anticoagulation.  All home medications were continued.  The patient received physical therapy daily and daily labs were followed.  The Splint was kept clean, dry, intact. The rest of the hospital stay was unremarkable. The patient was discharged in stable condition to follow up as outpatient.

## 2020-02-10 NOTE — PHYSICAL THERAPY INITIAL EVALUATION ADULT - BALANCE TRAINING, PT EVAL
Patient will be normal in static and dynamic standing balance with axillary crutches in 2-3 days to improve safety and decrease risk of falls.

## 2020-02-10 NOTE — DISCHARGE NOTE PROVIDER - NSDCMRMEDTOKEN_GEN_ALL_CORE_FT
Colace 100 mg oral capsule: 2 cap(s) orally 2 times a day, As Needed -for constipation  Ecotrin 325 mg oral delayed release tablet: 1 tab(s) orally 2 times a day for blood clot prevention  oxyCODONE-acetaminophen 5 mg-325 mg oral tablet: 1 tab(s) orally every 4 hours, As Needed -for severe pain MDD:6 tabs per day  Zofran ODT 4 mg oral tablet, disintegratin tab(s) orally every 8 hours, As Needed -for nausea Colace 100 mg oral capsule: 2 cap(s) orally 2 times a day, As Needed -for constipation  Colace 100 mg oral capsule: 1 cap(s) orally 2 times a day, As Needed   Take as needed for constipation  Ecotrin 325 mg oral delayed release tablet: 1 tab(s) orally 2 times a day MDD:2    For DVT (blood clot) prophylaxis   ondansetron 4 mg oral tablet: 1 tab(s) orally 3 times a day, As Needed    Take as needed for nausea   Percocet 5/325 oral tablet: 1-2 tab(s) orally every 4 to 6 hours, As Needed MDD:6   Moderate to severe pain

## 2020-02-11 ENCOUNTER — TRANSCRIPTION ENCOUNTER (OUTPATIENT)
Age: 50
End: 2020-02-11

## 2020-02-11 VITALS
DIASTOLIC BLOOD PRESSURE: 83 MMHG | TEMPERATURE: 98 F | HEART RATE: 83 BPM | RESPIRATION RATE: 17 BRPM | OXYGEN SATURATION: 97 % | SYSTOLIC BLOOD PRESSURE: 134 MMHG

## 2020-02-11 LAB
ANION GAP SERPL CALC-SCNC: 8 MMOL/L — SIGNIFICANT CHANGE UP (ref 5–17)
BUN SERPL-MCNC: 11 MG/DL — SIGNIFICANT CHANGE UP (ref 7–23)
CALCIUM SERPL-MCNC: 9 MG/DL — SIGNIFICANT CHANGE UP (ref 8.5–10.1)
CHLORIDE SERPL-SCNC: 103 MMOL/L — SIGNIFICANT CHANGE UP (ref 96–108)
CO2 SERPL-SCNC: 27 MMOL/L — SIGNIFICANT CHANGE UP (ref 22–31)
CREAT SERPL-MCNC: 1 MG/DL — SIGNIFICANT CHANGE UP (ref 0.5–1.3)
GLUCOSE SERPL-MCNC: 92 MG/DL — SIGNIFICANT CHANGE UP (ref 70–99)
HCT VFR BLD CALC: 34.5 % — SIGNIFICANT CHANGE UP (ref 34.5–45)
HGB BLD-MCNC: 11 G/DL — LOW (ref 11.5–15.5)
POTASSIUM SERPL-MCNC: 3.9 MMOL/L — SIGNIFICANT CHANGE UP (ref 3.5–5.3)
POTASSIUM SERPL-SCNC: 3.9 MMOL/L — SIGNIFICANT CHANGE UP (ref 3.5–5.3)
SODIUM SERPL-SCNC: 138 MMOL/L — SIGNIFICANT CHANGE UP (ref 135–145)

## 2020-02-11 RX ADMIN — Medication 500 MILLIGRAM(S): at 05:36

## 2020-02-11 RX ADMIN — Medication 325 MILLIGRAM(S): at 12:35

## 2020-02-11 RX ADMIN — HYDROMORPHONE HYDROCHLORIDE 0.5 MILLIGRAM(S): 2 INJECTION INTRAMUSCULAR; INTRAVENOUS; SUBCUTANEOUS at 09:15

## 2020-02-11 RX ADMIN — OXYCODONE HYDROCHLORIDE 10 MILLIGRAM(S): 5 TABLET ORAL at 11:15

## 2020-02-11 RX ADMIN — Medication 100 MILLIGRAM(S): at 03:16

## 2020-02-11 RX ADMIN — Medication 1 TABLET(S): at 12:35

## 2020-02-11 RX ADMIN — OXYCODONE HYDROCHLORIDE 10 MILLIGRAM(S): 5 TABLET ORAL at 06:11

## 2020-02-11 RX ADMIN — Medication 325 MILLIGRAM(S): at 05:36

## 2020-02-11 RX ADMIN — Medication 325 MILLIGRAM(S): at 08:56

## 2020-02-11 RX ADMIN — Medication 1000 MILLIGRAM(S): at 06:00

## 2020-02-11 RX ADMIN — Medication 400 MILLIGRAM(S): at 05:36

## 2020-02-11 RX ADMIN — Medication 1 MILLIGRAM(S): at 12:35

## 2020-02-11 RX ADMIN — OXYCODONE HYDROCHLORIDE 10 MILLIGRAM(S): 5 TABLET ORAL at 15:30

## 2020-02-11 RX ADMIN — HYDROMORPHONE HYDROCHLORIDE 0.5 MILLIGRAM(S): 2 INJECTION INTRAMUSCULAR; INTRAVENOUS; SUBCUTANEOUS at 09:00

## 2020-02-11 RX ADMIN — OXYCODONE HYDROCHLORIDE 10 MILLIGRAM(S): 5 TABLET ORAL at 07:08

## 2020-02-11 RX ADMIN — OXYCODONE HYDROCHLORIDE 10 MILLIGRAM(S): 5 TABLET ORAL at 00:46

## 2020-02-11 RX ADMIN — OXYCODONE HYDROCHLORIDE 10 MILLIGRAM(S): 5 TABLET ORAL at 10:18

## 2020-02-11 RX ADMIN — OXYCODONE HYDROCHLORIDE 10 MILLIGRAM(S): 5 TABLET ORAL at 14:35

## 2020-02-11 NOTE — DISCHARGE NOTE NURSING/CASE MANAGEMENT/SOCIAL WORK - PATIENT PORTAL LINK FT
You can access the FollowMyHealth Patient Portal offered by [facility] by registering at the following website: [email or web address]. By joining [facility]’s FollowMyHealth portal, you will also be able to view your health information using other applications (apps) compatible with our system.

## 2020-02-11 NOTE — PROGRESS NOTE ADULT - SUBJECTIVE AND OBJECTIVE BOX
Patient seen and examined. Pain controlled. No acute events overnight. denies cp/sob. denies numbness/tingling. c/o that cast feels tight.    HEALTH ISSUES - PROBLEM Dx:        MEDICATIONS  (STANDING):  ascorbic acid 500 milliGRAM(s) Oral two times a day  aspirin enteric coated 325 milliGRAM(s) Oral two times a day  ferrous    sulfate 325 milliGRAM(s) Oral three times a day with meals  folic acid 1 milliGRAM(s) Oral daily  lactated ringers. 1000 milliLiter(s) IV Continuous <Continuous>  multivitamin 1 Tablet(s) Oral daily    Allergies    No Known Allergies    Intolerances                Vital Signs Last 24 Hrs  T(C): 37.1 (02-11-20 @ 04:00), Max: 37.1 (02-10-20 @ 14:30)  T(F): 98.8 (02-11-20 @ 04:00), Max: 98.8 (02-10-20 @ 14:30)  HR: 82 (02-11-20 @ 04:00) (76 - 91)  BP: 139/80 (02-11-20 @ 04:00) (126/65 - 153/80)  BP(mean): --  RR: 17 (02-11-20 @ 04:00) (11 - 18)  SpO2: 97% (02-11-20 @ 04:00) (94% - 99%)    Physical Exam  Gen: NAD  LLE:   Dressing c/d/i  moving all toes, no pain with passive stretch  SILT distally  brisk cap refill, warm/well perfused  Compartments soft    ace taken down, webril windowed, compartments soft. proximal edge of splint removed due to sharp edge. extra padding added and loose ace wrap reapplied.     A/P: 49y Female sp L distal tibia and fibula ORIF POD 1  Pain control  DVT ppx - ecotrin  PT/NWB LLE with crutches/OOB  FU labs  Ice/elevate  keep splint c.d.i  Incentive spirometry  plan DC home today
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
## 2020-02-16 DIAGNOSIS — S82.302A UNSPECIFIED FRACTURE OF LOWER END OF LEFT TIBIA, INITIAL ENCOUNTER FOR CLOSED FRACTURE: ICD-10-CM

## 2020-02-16 DIAGNOSIS — S82.832A OTHER FRACTURE OF UPPER AND LOWER END OF LEFT FIBULA, INITIAL ENCOUNTER FOR CLOSED FRACTURE: ICD-10-CM

## 2020-08-06 NOTE — H&P PST ADULT - SKIN/BREAST
Patient: Conchita Boss    Procedure(s):   SECTION    Diagnosis: Indication for care in labor or delivery [O75.9]  Diagnosis Additional Information: No value filed.    Anesthesia Type:   Spinal     Note:  Airway :Room Air  Patient transferred to:Labor and Delivery  Comments: Transferred to OB PACU recovery, spontaneous respirations on room air with oxygen saturations maintained greater than 95%. SpO2, NiBP, and EKG monitors and alarms on and functioning, report on patient's clinical status given to OB recovery RN, RN questions answered, patient in hospital bed with siderails up Oxytocin 30 units in 500mL infusion connected to IV infusion pump in recovery bay and programmed to 100 mL/hr at handoff of care.  Handoff Report: Identifed the Patient, Identified the Reponsible Provider, Reviewed the pertinent medical history, Discussed the surgical course, Reviewed Intra-OP anesthesia mangement and issues during anesthesia, Set expectations for post-procedure period and Allowed opportunity for questions and acknowledgement of understanding      Vitals: (Last set prior to Anesthesia Care Transfer)    CRNA VITALS  2020 1636 - 2020 1707      2020             Resp Rate (set):  10                Electronically Signed By: MINESH To CRNA  2020  5:07 PM   negative

## 2020-09-08 NOTE — PHYSICAL THERAPY INITIAL EVALUATION ADULT - BRACE/ORTHOTICS
Name: Hyun Canela    : 1940  [facility]       Chief Complaint   Patient presents with    Leg Pain     right        Patient's Pharmacies:    [facility] [place] [id number]  Phone: [phone] Fax: [phone]       Visit Vitals  Temp 97.6 °F (36.4 °C)   Ht 5' 7\" (1.702 m)   BMI 22.87 kg/m²        Allergies   Allergen Reactions    Ace Inhibitors Swelling     Angioedema with Lotrel. Current Outpatient Medications   Medication Sig Dispense Refill    amLODIPine (NORVASC) 10 mg tablet Take 1 Tab by mouth daily. 30 Tab 2    furosemide (LASIX) 20 mg tablet Take 1 Tab by mouth daily. 30 Tab 0    amLODIPine (NORVASC) 10 mg tablet take 1 tablet by mouth once daily 90 Tab 1    alendronate (FOSAMAX) 70 mg tablet take 1 tablet by mouth every week 4 Tab 5    buPROPion SR (WELLBUTRIN SR) 150 mg SR tablet Take 1 Tab by mouth two (2) times a day. 60 Tab 2    aspirin/salicylamide/caffeine (BC HEADACHE POWDER PO) Take  by mouth.  acetaminophen (TYLENOL) 500 mg tablet Take 500 mg by mouth.  trolamine salicylate-aloe vera 02% (ASPERCREME) topical cream Apply  to affected area as needed for Pain. 1 Tube 1     Current Facility-Administered Medications   Medication Dose Route Frequency Provider Last Rate Last Dose    triamcinolone acetonide (KENALOG-40) 40 mg/mL injection 40 mg  40 mg Other ONCE Blaise Chambers MD        lidocaine (XYLOCAINE) 10 mg/mL (1 %) injection 1 mL  1 mL Other ONCE Blaise Chambers MD            Patient Active Problem List   Diagnosis Code    Chronic pain of left knee M25.562, G89.29    Hypertension, uncontrolled I10        No family history on file.      Social History     Socioeconomic History    Marital status:      Spouse name: Not on file    Number of children: Not on file    Years of education: Not on file  Highest education level: Not on file   Tobacco Use    Smoking status: Former Smoker     Packs/day: 0.15     Years: 24.00     Pack years: 3.60     Types: Cigarettes     Start date:      Last attempt to quit: 2018     Years since quittin.2    Smokeless tobacco: Never Used   Substance and Sexual Activity    Alcohol use: No    Drug use: No    Sexual activity: Not Currently        Past Surgical History:   Procedure Laterality Date    HX OTHER SURGICAL      \"leg surgery\"        Past Medical History:   Diagnosis Date    Hypertension     Knee pain         I have reviewed and agree with PFS and [facility] and intake form in chart and the record. Review of Systems:   Patient is a pleasant appearing individual, appropriately dressed, well hydrated, well nourished, who is alert, appropriately oriented for age, and in no acute distress with a normal gait and normal affect who does not appear to be in any significant pain. Physical Exam:  Right Ankle- Grossly neurovascularly intact with good cap refill, decreased range of motion, decreased strength, positive crepitation, minimal swelling, skin intact with no skin lesions, no erythema, no echymosis, no point tenderness. Left Ankle - No point tenderness, Full range of motion, No instability, No Weakness, No, skin lesions, No swelling, No instability, Grossly neurovascularly intact. Procedure Documentation:    Please note that makerSQR ultrasound was used to perform an ultrasound guided injection into the right ankle. A pre-injection ultrasound was taken of right ankle. After the needle was placed into the lateral aspect of the ankle(s) and while the kenelog was injected another ultrasound picture confirmed the appropriate placement. The site of injection, right ankle, was sterilely prepped. The injection of 40 mg Kenalog and Lidocaine was administered appropriately in right ankle and the patient tolerated it well. No site reaction was identified. Appropriate dressing was placed. Consent was obtained for the injection. Encounter Diagnoses     ICD-10-CM ICD-9-CM   1. Right ankle pain, unspecified chronicity  M25.571 719.47   2. Arthritis of right ankle  M19.071 716.97   3. Effusion of right ankle  M25.471 719.07          Scribed by Leoncio Smith LPN as dictated by [facility] RESPONSE [place] LOLA Mcintyre MD.    HPI:  The patient is here with a chief complaint of right ankle pain, throbbing burning pain, progressively getting worse. Nothing has helped. Smoking makes it worse. Pain is 7/10. ROS:  10-point review of systems is positive for joint swelling and instability. X-rays of the right ankle done in our office as positive for severe osteoarthritis (OA). Assessment/Plan:  1. Right ankle osteoarthritis (OA). Plan is for cortisone injection. If it helps that is all we need to do and we will go from there. Return to Office: Follow-up Information    None             Documentation True and Accepted Blaise Mcintyre MD LLE cast

## 2021-02-13 NOTE — OCCUPATIONAL THERAPY INITIAL EVALUATION ADULT - ANTICIPATED DISCHARGE DISPOSITION, OT EVAL
-Primary NAG metabolic acidosis, AG 18. 2/2 lactate, BUN (2/2 BERTRAND), likely w/ element of starvation ketosis  -Compensated w/ respiratory alkalosis, VBG: pH 7.36, pCO2 36  -Monitor for improvement w/ rehydration and resolution of BERTRAND
home w/ OT/home OT services to maximize functional independence

## 2021-03-10 NOTE — H&P PST ADULT - MARITAL STATUS
Initiate Treatment: triamcinolone acetonide 0.025 % topical ointment BID\\nSi application BID to affected areas on the neck, ears and arms. Plan: Discussed with parent biopsy is an option if rash persists or worsens Detail Level: Zone Single

## 2021-05-14 NOTE — PATIENT PROFILE ADULT - HOME ACCESSIBILITY CONCERNS
Addended by: ABY HERNANDEZ on: 5/14/2021 02:47 PM     Modules accepted: Orders    
stairs within home

## 2021-09-01 NOTE — ED PROVIDER NOTE - NS ED MD DISPO ISOLATION TYPES
30 DAY [facility] VISIT    Diagnostic AHI:   35.6  HST    Message left for patient to return call     Assessment: Pt not meeting objective benchmarks for compliance     Action plan: waiting for patient to return call.   Patient has not scheduled a follow up visit   2 wk [facility] recheck scheduled.   Device type: Auto-CPAP  PAP settings: CPAP min 5.0 cm  H20     CPAP max 15.0 cm  H20        95th% pressure 13.1 cm  H20      RESMED EPR level Setting: TWO    RESMED Soft response setting:  OFF  Mask type:  Per patient choice  Objective measures: 14 day rolling measures      Compliance  57 %      Leak  12 lpm  last  upload      AHI 1.95   last  upload      Average number of minutes 272      Objective measure goal  Compliance   Goal >70%  Leak   Goal < 24 lpm  AHI  Goal < 5  Usage  Goal >240        Total time spent on accessing and interpreting remote patient PAP therapy data  10 minutes    Total time spent counseling, coaching  and reviewing PAP therapy data with patient  0 minutes     [id number] this call  [id number] no  at 3 or 14 day [facility]    
Patient called and left a message for virtual care coordinator.  Coordinator called back and left another message requesting a call back again from patient.     
None

## 2022-02-17 NOTE — PHYSICAL THERAPY INITIAL EVALUATION ADULT - LIVES WITH, PROFILE
Patient is also inquiring about estrogren cream E2 E3 - She stated [facility] pharmacy has requested the medication via fax.  Thank you children/daughter who can assist once at home.

## 2024-01-01 NOTE — PATIENT PROFILE ADULT - HAVE YOU EXPERIENCED VIOLENCE OR A TRAUMATIC EVENT?
Neonatology Daily Progress Note    Paulette Funez is a 3 week old female infant  MRN: [id number]  Gestational Age: 33w5d  Birth weight: 2030 g   DOL: 26 days    PMA: 37w3d      HOSPITAL PROBLEMS:  Principal Problem:     , gestational age 33 completed weeks (CMD)  Active Problems:    Low birth weight or  infant, 2250-6570 grams (CMD)     infant of preeclamptic mother    Slow feeding in   Resolved Problems:    TTN (transient tachypnea of )       INTERVAL EVENTS SINCE PREVIOUS NOTE:  -No acute events overnight  -Weight change: 20 g    PHYSICAL EXAM    Vital signs:     Vital Last Value 24 Hour Range   Temperature 98.4 °F (36.9 °C) (24 0300) Temp  Min: 97.7 °F (36.5 °C)  Max: 98.4 °F (36.9 °C)   Pulse 165 (24 0600) Pulse  Min: 109  Max: 179   Respiratory (!) 62 (24 0600) Resp  Min: 21  Max: 80   Non-Invasive  Blood Pressure (!) 91/53 (24 2100) BP  Min: 77/43  Max: 91/53   Pulse Oximetry 100 % (24 0600) SpO2  Min: 98 %  Max: 100 %   Arterial   Blood Pressure   No data recorded     General: Well appearing, no acute distress, responds to stimuli  HENT: AFSOF, normocephalic, ears normally set, no cleft, palate intact; NG in place  Neck: supple, full range of motion  CV: RRR, no murmurs, 2+ pulses, good perfusion  Lungs: clear and equal bilaterally, no retractions, no nasal flaring  Abdomen: soft, non-tender, non-distended, no organomegaly  Extremities: negative O/B; FROM x 4  Neuro: good tone  Skin: no rash  : normal for GA; anus patent  Back: no ghislaine, no dimples    Labs (Last 24 hours)  No results found for this or any previous visit (from the past 24 hour(s)).      ASSESSMENT AND PLAN BY SYSTEM       FLUID ELECTROLYTES NUTRITION     Weight Length Head circumference      Wt Readings from Last 2 Encounters:   24 2420 g (<1%, Z= -3.48)*     * Growth percentiles are based on WHO (Girls, 0-2 years) data.      current - 18.9\" (48 cm) current -  31.5 cm (12.4\")   Weight change: 20 g          Dosing Weight: 2030 g          No results found    POC GLUCOSE:   No results available in last 24 hours    Alkaline Phosphatase:  No results found       INPUT:    IVF:     No IVFs being administered         Feeding:     Neosure 22kcal           GIR: N/A mg/kg/min        POAL         OUTPUT:    Intake/Output          0700   0659  0700   0659    P.O. (mL/kg) 359 (148.35)     NG/GT (mL/kg)      Total Intake(mL/kg) 359 (148.35)     Net +359           Unmeasured Urine Occurrence 8 x     Unmeasured Stool Occurrence 1 x              Assessment:    infant and slow feeding of     Plan:  - Continue feeds of Neosure 22kcal POAL   -Poly visol 1ml  - ST on consult  - donor consent refused    CENTRAL LINE AND CATHETER DISCUSSION     Central Line  Type of Central Line:   Peripheral IV 24 Left Hand 24 (Inactive )       GI Tube 24 Orogastric Oral cavity (Active)       Line Functioning appropriately: NA  Necessity/Indication: No Central line in place  Continued need for Central Line discussed: NA 2024      Urinary Catheter  Necessity/Indication: N/A  Continued need for Urinary Catheter discussed: N/A      PAIN/SEDATION     NPASS Pain Score  Min: 0  Max: 0    Pain/Sedation Medications: None in use    Plan:   - continue to monitor N-PASS scores      BILIRUBIN     Assessment:No history of hyperbilirubinemia    Baby's blood type:  No results found  Maternal blood type  A+  Information for the patient's mother:  Elvira Funez [[id number]]   No results found Antibody:   Information for the patient's mother:  Elvira Funez [[id number]]   No results found     Last Bilirubin:   No results found     Light level: 13.4 mg/dL    Plan:   -Recheck bilirubin PRN    APNEA/BRADYCARDIA/DESATURATION     Assessment: N/A    24H Events:   No data found.      Last Significant Event: 6/15/24       -Medications: - No medications  - Caffeine loading dose  20mg/kg/dose - Yes Date 2024    Plan:   - Clinically monitor    RESPIRATORY     Mode: Room Air      Ventilator days: 0  Days on CPAP/HFNC: -; -6/3  Days on Oxygen: -; -6/3     Assessment: Respiratory distress    Intubation  Necessity/Indication: Not Intubated  Readiness for Extubation discussed: N/A 2024    Plan:   - continue SpO2 monitoring  - Monitor clinically     CARDIOVASCULAR     Assessment: No active cardiac issues     No valid procedures specified.     Plan:  - continue to monitor clinically    HEMATOLOGY     Assessment: No active hematologic issues    No results found    Transfusions:   PRBC: None    Platelet: None    FFP: None     Plan:  - monitor clinically    INFECTIOUS DISEASE     Assessment: Sepsis considered ruled out    No results found      Microbiology Results       None           - Blood culture and CBC on admission:  CBC   - Initial antibiotic course: No Antibiotics were initiated due to low risk of Sepsis - But will initiate later, if clinically indicated.       Plan:  - continue to monitor clinically     NEUROLOGY     Assessment: No active neurologic concerns    Head Ultrasound Day of LIfe 7: Not indicated  Head Ultrasound Day of Life 28: Not indicated    Plan:   - continue to monitor clinically    OPHTHALMOLOGY     Assessment: No active ophthalmology issues    NA  Last Ophthalmologic exam:    Next Ophthalmologic exam due:      Plan:   - ROP Exam as per unit policy @ 4-6 weeks of age    MEDICATIONS     Current Facility-Administered Medications   Medication    pediatric multivitamin (POLY-VI-SOL) oral solution 1 mL        DISCHARGE TASKS     Discharge planning discussed. Tentative discharge this weekend.    HEALTH MAINTENANCE AND DISCHARGE PLANNING     Immunizations:   Most Recent Immunizations   Administered Date(s) Administered    Hep B, adolescent or pediatric 2024      Hearing Test:       ROP Eye Exam Needed?: No (24 1200) No    Car  Seat Screen:      CCHD Screening:   Screening complete: Done (24 0600)  Right hand reading %: 100 %  Foot reading %: 99 %  CHD: Normal    Circumcision:       State Screen- date drawn (most recent results):    Last 3 results:      Synagis Candidate:      Pediatrician: CARLOTA    PARENTAL COMMUNICATION     Family present during rounds: Lyssa Dean MD  PGY-2 Pediatrics  [facility]    2024  7:44 AM         no

## 2024-05-02 ENCOUNTER — APPOINTMENT (OUTPATIENT)
Dept: OBGYN | Facility: CLINIC | Age: 54
End: 2024-05-02
Payer: MEDICAID

## 2024-05-02 VITALS
WEIGHT: 225 LBS | SYSTOLIC BLOOD PRESSURE: 137 MMHG | DIASTOLIC BLOOD PRESSURE: 84 MMHG | HEART RATE: 73 BPM | HEIGHT: 69 IN | BODY MASS INDEX: 33.33 KG/M2

## 2024-05-02 DIAGNOSIS — K42.9 UMBILICAL HERNIA W/OUT OBSTRUCTION OR GANGRENE: ICD-10-CM

## 2024-05-02 DIAGNOSIS — Z78.9 OTHER SPECIFIED HEALTH STATUS: ICD-10-CM

## 2024-05-02 PROBLEM — Z00.00 ENCOUNTER FOR PREVENTIVE HEALTH EXAMINATION: Status: ACTIVE | Noted: 2024-05-02

## 2024-05-02 PROCEDURE — 99459 PELVIC EXAMINATION: CPT

## 2024-05-02 PROCEDURE — 99204 OFFICE O/P NEW MOD 45 MIN: CPT

## 2024-05-02 RX ORDER — CETIRIZINE HCL 10 MG
10 TABLET ORAL
Refills: 0 | Status: ACTIVE | COMMUNITY

## 2024-05-02 NOTE — DISCUSSION/SUMMARY
[FreeTextEntry1] : 52 yo P2 w/symptomatic ut myomas.  Pt w/HMB and dysmenorrhea. Bleeding has impacted her QOL.  Options for intervention reviewed. Pt interested in definitive surgical intervention.  Plan Schedule TLH RTO EMB USG

## 2024-05-02 NOTE — PHYSICAL EXAM
[[id number]] : A chaperone was present during the pelvic exam. [FreeTextEntry2] : Zulma [FreeTextEntry7] : Abd w/interesting cruciform indentation - no prior surgeries. Abd soft, ND [Normal] : normal [FreeTextEntry6] : Exam limited by habitus. Ut ~ 13 wks, mobile.

## 2024-05-30 ENCOUNTER — APPOINTMENT (OUTPATIENT)
Dept: OBGYN | Facility: CLINIC | Age: 54
End: 2024-05-30
Payer: MEDICAID

## 2024-05-30 ENCOUNTER — ASOB RESULT (OUTPATIENT)
Age: 54
End: 2024-05-30

## 2024-05-30 VITALS
BODY MASS INDEX: 33.47 KG/M2 | SYSTOLIC BLOOD PRESSURE: 158 MMHG | WEIGHT: 226 LBS | HEART RATE: 71 BPM | HEIGHT: 69 IN | DIASTOLIC BLOOD PRESSURE: 85 MMHG

## 2024-05-30 DIAGNOSIS — D25.9 LEIOMYOMA OF UTERUS, UNSPECIFIED: ICD-10-CM

## 2024-05-30 PROCEDURE — 58100 BIOPSY OF UTERUS LINING: CPT

## 2024-05-30 PROCEDURE — 99213 OFFICE O/P EST LOW 20 MIN: CPT | Mod: 25

## 2024-05-30 PROCEDURE — 76830 TRANSVAGINAL US NON-OB: CPT

## 2024-06-01 PROBLEM — D25.9 FIBROID, UTERINE: Status: ACTIVE | Noted: 2024-05-02

## 2024-06-01 NOTE — HISTORY OF PRESENT ILLNESS
[FreeTextEntry1] : 52 yo P2 here for EMB after USG. Pt scheduled for TLH 7/26/24 for fibroids. Pt mentioned small umb hernia.

## 2024-06-01 NOTE — PROCEDURE
[Endometrial Biopsy] : Endometrial biopsy [Risks] : risks [Infection] : infection [Tenaculum] : Tenaculum [Sounded to ___ cm] : sounded to [unfilled] ~Ucm [Moderate] : moderate [Specimen Collected] : collected [de-identified] : Preop [de-identified] : May not have passed internal os. Pt discomfort.

## 2024-06-01 NOTE — DISCUSSION/SUMMARY
[FreeTextEntry1] : Now s/p EMB  (May not have entered EM cavity). On schedule for TLH - consider addressing umb hernia. USG reviewed. Questions answered.  Plan F/u EMB

## 2024-06-12 LAB — CORE LAB BIOPSY: NORMAL

## 2024-07-15 ENCOUNTER — APPOINTMENT (OUTPATIENT)
Dept: OBGYN | Facility: CLINIC | Age: 54
End: 2024-07-15
Payer: MEDICAID

## 2024-07-15 DIAGNOSIS — D25.9 LEIOMYOMA OF UTERUS, UNSPECIFIED: ICD-10-CM

## 2024-07-15 PROCEDURE — 99213 OFFICE O/P EST LOW 20 MIN: CPT

## 2024-07-19 ENCOUNTER — OUTPATIENT (OUTPATIENT)
Dept: OUTPATIENT SERVICES | Facility: HOSPITAL | Age: 54
LOS: 1 days | End: 2024-07-19

## 2024-07-19 VITALS
RESPIRATION RATE: 16 BRPM | HEART RATE: 67 BPM | WEIGHT: 227.96 LBS | SYSTOLIC BLOOD PRESSURE: 138 MMHG | OXYGEN SATURATION: 97 % | TEMPERATURE: 97 F | HEIGHT: 68.75 IN | DIASTOLIC BLOOD PRESSURE: 78 MMHG

## 2024-07-19 DIAGNOSIS — Z98.890 OTHER SPECIFIED POSTPROCEDURAL STATES: Chronic | ICD-10-CM

## 2024-07-19 DIAGNOSIS — D25.9 LEIOMYOMA OF UTERUS, UNSPECIFIED: ICD-10-CM

## 2024-07-19 PROBLEM — Z78.9 OTHER SPECIFIED HEALTH STATUS: Chronic | Status: INACTIVE | Noted: 2020-02-03 | Resolved: 2024-07-19

## 2024-07-19 LAB
A1C WITH ESTIMATED AVERAGE GLUCOSE RESULT: 5 % — SIGNIFICANT CHANGE UP (ref 4–5.6)
ANION GAP SERPL CALC-SCNC: 12 MMOL/L — SIGNIFICANT CHANGE UP (ref 7–14)
BLD GP AB SCN SERPL QL: NEGATIVE — SIGNIFICANT CHANGE UP
BUN SERPL-MCNC: 9 MG/DL — SIGNIFICANT CHANGE UP (ref 7–23)
CALCIUM SERPL-MCNC: 9.2 MG/DL — SIGNIFICANT CHANGE UP (ref 8.4–10.5)
CHLORIDE SERPL-SCNC: 102 MMOL/L — SIGNIFICANT CHANGE UP (ref 98–107)
CO2 SERPL-SCNC: 24 MMOL/L — SIGNIFICANT CHANGE UP (ref 22–31)
CREAT SERPL-MCNC: 1.09 MG/DL — SIGNIFICANT CHANGE UP (ref 0.5–1.3)
EGFR: 62 ML/MIN/1.73M2 — SIGNIFICANT CHANGE UP
ESTIMATED AVERAGE GLUCOSE: 97 — SIGNIFICANT CHANGE UP
GLUCOSE SERPL-MCNC: 91 MG/DL — SIGNIFICANT CHANGE UP (ref 70–99)
HCG UR QL: NEGATIVE — SIGNIFICANT CHANGE UP
HCT VFR BLD CALC: 39 % — SIGNIFICANT CHANGE UP (ref 34.5–45)
HGB BLD-MCNC: 12.7 G/DL — SIGNIFICANT CHANGE UP (ref 11.5–15.5)
MCHC RBC-ENTMCNC: 27.9 PG — SIGNIFICANT CHANGE UP (ref 27–34)
MCHC RBC-ENTMCNC: 32.6 GM/DL — SIGNIFICANT CHANGE UP (ref 32–36)
MCV RBC AUTO: 85.7 FL — SIGNIFICANT CHANGE UP (ref 80–100)
NRBC # BLD: 0 /100 WBCS — SIGNIFICANT CHANGE UP (ref 0–0)
NRBC # FLD: 0 K/UL — SIGNIFICANT CHANGE UP (ref 0–0)
PLATELET # BLD AUTO: 381 K/UL — SIGNIFICANT CHANGE UP (ref 150–400)
POTASSIUM SERPL-MCNC: 4.5 MMOL/L — SIGNIFICANT CHANGE UP (ref 3.5–5.3)
POTASSIUM SERPL-SCNC: 4.5 MMOL/L — SIGNIFICANT CHANGE UP (ref 3.5–5.3)
RBC # BLD: 4.55 M/UL — SIGNIFICANT CHANGE UP (ref 3.8–5.2)
RBC # FLD: 13.8 % — SIGNIFICANT CHANGE UP (ref 10.3–14.5)
RH IG SCN BLD-IMP: POSITIVE — SIGNIFICANT CHANGE UP
RH IG SCN BLD-IMP: POSITIVE — SIGNIFICANT CHANGE UP
SODIUM SERPL-SCNC: 138 MMOL/L — SIGNIFICANT CHANGE UP (ref 135–145)
WBC # BLD: 5.55 K/UL — SIGNIFICANT CHANGE UP (ref 3.8–10.5)
WBC # FLD AUTO: 5.55 K/UL — SIGNIFICANT CHANGE UP (ref 3.8–10.5)

## 2024-07-19 NOTE — H&P PST ADULT - HISTORY OF PRESENT ILLNESS
53 yr old female with preop dx of leiomyoma of uterus presents to have PST eval for Total Laparoscopic Hysterectomy.    Pt w/1 yr of HMB, now w/dysmenorrhea, bloating. Bleeding and pain has significantly affected her QOL.

## 2024-07-19 NOTE — H&P PST ADULT - NSICDXPASTSURGICALHX_GEN_ALL_CORE_FT
PAST SURGICAL HISTORY:  Status post breast reduction 7/2017    Status post ORIF of fracture of ankle

## 2024-07-19 NOTE — H&P PST ADULT - PROBLEM SELECTOR PLAN 1
Scheduled for Total Laparoscopic hysterectomy on 7/26/24.  Pre-op instructions provided. Pt given verbal and written instructions with teach back on chlorhexidine shampoo and pepcid. Pt verbalized understanding with return demonstration. Scheduled for Total Laparoscopic hysterectomy on 7/26/24.  Pre-op instructions provided. Pt given verbal and written instructions with teach back on chlorhexidine shampoo and pepcid. Pt verbalized understanding with return demonstration.  CBC, BMP, Type/ABO, Hgba1c, UCG done. Results pending.

## 2024-07-19 NOTE — H&P PST ADULT - BLOOD TRANSFUSION, PREVIOUS, PROFILE
Bedside, Verbal and Written shift change report given to Ga Contreras RN (oncoming nurse) by Rob Reis RN (offgoing nurse). Report included the following information SBAR, Kardex, ED Summary, Procedure Summary, Intake/Output, MAR, Accordion and Recent Results. no

## 2024-07-25 ENCOUNTER — TRANSCRIPTION ENCOUNTER (OUTPATIENT)
Age: 54
End: 2024-07-25

## 2024-07-25 NOTE — ASU PATIENT PROFILE, ADULT - FALL HARM RISK - FALL HARM RISK
Hypercholesterolemia Monitoring: I explained this is common when taking isotretinoin. We will monitor closely. No indicators present

## 2024-07-26 ENCOUNTER — APPOINTMENT (OUTPATIENT)
Dept: OBGYN | Facility: HOSPITAL | Age: 54
End: 2024-07-26

## 2024-07-26 ENCOUNTER — TRANSCRIPTION ENCOUNTER (OUTPATIENT)
Age: 54
End: 2024-07-26

## 2024-08-08 ENCOUNTER — APPOINTMENT (OUTPATIENT)
Dept: OBGYN | Facility: CLINIC | Age: 54
End: 2024-08-08

## 2024-08-08 PROCEDURE — 99024 POSTOP FOLLOW-UP VISIT: CPT

## 2024-08-10 NOTE — DISCUSSION/SUMMARY
[FreeTextEntry1] : 54 yo P2 here for PO check after jamel TL BS for fibroids 7/26/24. Pt recovering well. Path pending.  Plan F/u path RTO for second PO check

## 2024-08-10 NOTE — HISTORY OF PRESENT ILLNESS
[FreeTextEntry1] : 54 yo P2 here for PO check after jamel TLH BS for fibroids 7/26/24. No sig complaints

## 2024-08-10 NOTE — PHYSICAL EXAM
[84241] : A chaperone was present during the pelvic exam. [FreeTextEntry7] : Cruciate abd. Soft, NT, ND. Inc c/d/i [FreeTextEntry4] : Cuff in tact

## 2024-08-10 NOTE — PHYSICAL EXAM
[[id number]] : A chaperone was present during the pelvic exam. [FreeTextEntry7] : Cruciate abd. Soft, NT, ND. Inc c/d/i [FreeTextEntry4] : Cuff in tact

## 2024-08-21 ENCOUNTER — EMERGENCY (EMERGENCY)
Facility: HOSPITAL | Age: 54
LOS: 1 days | Discharge: HOME | End: 2024-08-21
Attending: EMERGENCY MEDICINE | Admitting: EMERGENCY MEDICINE
Payer: MEDICAID

## 2024-08-21 VITALS
TEMPERATURE: 98 F | HEIGHT: 69 IN | HEART RATE: 78 BPM | SYSTOLIC BLOOD PRESSURE: 145 MMHG | RESPIRATION RATE: 18 BRPM | WEIGHT: 220.02 LBS | OXYGEN SATURATION: 97 % | DIASTOLIC BLOOD PRESSURE: 77 MMHG

## 2024-08-21 VITALS
OXYGEN SATURATION: 98 % | DIASTOLIC BLOOD PRESSURE: 95 MMHG | RESPIRATION RATE: 15 BRPM | HEART RATE: 72 BPM | TEMPERATURE: 98 F | SYSTOLIC BLOOD PRESSURE: 147 MMHG

## 2024-08-21 DIAGNOSIS — Z98.890 OTHER SPECIFIED POSTPROCEDURAL STATES: Chronic | ICD-10-CM

## 2024-08-21 LAB
ALBUMIN SERPL ELPH-MCNC: 4.1 G/DL — SIGNIFICANT CHANGE UP (ref 3.3–5)
ALP SERPL-CCNC: 68 U/L — SIGNIFICANT CHANGE UP (ref 40–120)
ALT FLD-CCNC: 10 U/L — SIGNIFICANT CHANGE UP (ref 4–33)
ANION GAP SERPL CALC-SCNC: 13 MMOL/L — SIGNIFICANT CHANGE UP (ref 7–14)
APTT BLD: 32.5 SEC — SIGNIFICANT CHANGE UP (ref 24.5–35.6)
AST SERPL-CCNC: 13 U/L — SIGNIFICANT CHANGE UP (ref 4–32)
BASOPHILS # BLD AUTO: 0.09 K/UL — SIGNIFICANT CHANGE UP (ref 0–0.2)
BASOPHILS NFR BLD AUTO: 1.2 % — SIGNIFICANT CHANGE UP (ref 0–2)
BILIRUB SERPL-MCNC: 0.3 MG/DL — SIGNIFICANT CHANGE UP (ref 0.2–1.2)
BLD GP AB SCN SERPL QL: NEGATIVE — SIGNIFICANT CHANGE UP
BLOOD GAS VENOUS COMPREHENSIVE RESULT: SIGNIFICANT CHANGE UP
BUN SERPL-MCNC: 10 MG/DL — SIGNIFICANT CHANGE UP (ref 7–23)
CALCIUM SERPL-MCNC: 9.4 MG/DL — SIGNIFICANT CHANGE UP (ref 8.4–10.5)
CHLORIDE SERPL-SCNC: 103 MMOL/L — SIGNIFICANT CHANGE UP (ref 98–107)
CO2 SERPL-SCNC: 23 MMOL/L — SIGNIFICANT CHANGE UP (ref 22–31)
CREAT SERPL-MCNC: 1.07 MG/DL — SIGNIFICANT CHANGE UP (ref 0.5–1.3)
EGFR: 63 ML/MIN/1.73M2 — SIGNIFICANT CHANGE UP
EGFR: 63 ML/MIN/1.73M2 — SIGNIFICANT CHANGE UP
EOSINOPHIL # BLD AUTO: 0.26 K/UL — SIGNIFICANT CHANGE UP (ref 0–0.5)
EOSINOPHIL NFR BLD AUTO: 3.4 % — SIGNIFICANT CHANGE UP (ref 0–6)
GLUCOSE SERPL-MCNC: 80 MG/DL — SIGNIFICANT CHANGE UP (ref 70–99)
HCT VFR BLD CALC: 39.1 % — SIGNIFICANT CHANGE UP (ref 34.5–45)
HGB BLD-MCNC: 13 G/DL — SIGNIFICANT CHANGE UP (ref 11.5–15.5)
IANC: 4.52 K/UL — SIGNIFICANT CHANGE UP (ref 1.8–7.4)
IMM GRANULOCYTES NFR BLD AUTO: 0.1 % — SIGNIFICANT CHANGE UP (ref 0–0.9)
INR BLD: 0.99 RATIO — SIGNIFICANT CHANGE UP (ref 0.85–1.18)
LYMPHOCYTES # BLD AUTO: 2.33 K/UL — SIGNIFICANT CHANGE UP (ref 1–3.3)
LYMPHOCYTES # BLD AUTO: 30.1 % — SIGNIFICANT CHANGE UP (ref 13–44)
MCHC RBC-ENTMCNC: 28 PG — SIGNIFICANT CHANGE UP (ref 27–34)
MCHC RBC-ENTMCNC: 33.2 GM/DL — SIGNIFICANT CHANGE UP (ref 32–36)
MCV RBC AUTO: 84.1 FL — SIGNIFICANT CHANGE UP (ref 80–100)
MONOCYTES # BLD AUTO: 0.54 K/UL — SIGNIFICANT CHANGE UP (ref 0–0.9)
MONOCYTES NFR BLD AUTO: 7 % — SIGNIFICANT CHANGE UP (ref 2–14)
NEUTROPHILS # BLD AUTO: 4.52 K/UL — SIGNIFICANT CHANGE UP (ref 1.8–7.4)
NEUTROPHILS NFR BLD AUTO: 58.2 % — SIGNIFICANT CHANGE UP (ref 43–77)
NRBC # BLD AUTO: 0 K/UL — SIGNIFICANT CHANGE UP (ref 0–0)
NRBC # BLD: 0 /100 WBCS — SIGNIFICANT CHANGE UP (ref 0–0)
NRBC # FLD: 0 K/UL — SIGNIFICANT CHANGE UP (ref 0–0)
NRBC BLD-RTO: 0 /100 WBCS — SIGNIFICANT CHANGE UP (ref 0–0)
PLATELET # BLD AUTO: 405 K/UL — HIGH (ref 150–400)
POTASSIUM SERPL-MCNC: 3.8 MMOL/L — SIGNIFICANT CHANGE UP (ref 3.5–5.3)
POTASSIUM SERPL-SCNC: 3.8 MMOL/L — SIGNIFICANT CHANGE UP (ref 3.5–5.3)
PROT SERPL-MCNC: 7.3 G/DL — SIGNIFICANT CHANGE UP (ref 6–8.3)
PROTHROM AB SERPL-ACNC: 11.1 SEC — SIGNIFICANT CHANGE UP (ref 9.5–13)
RBC # BLD: 4.65 M/UL — SIGNIFICANT CHANGE UP (ref 3.8–5.2)
RBC # FLD: 13.4 % — SIGNIFICANT CHANGE UP (ref 10.3–14.5)
RH IG SCN BLD-IMP: POSITIVE — SIGNIFICANT CHANGE UP
SODIUM SERPL-SCNC: 139 MMOL/L — SIGNIFICANT CHANGE UP (ref 135–145)
WBC # BLD: 7.75 K/UL — SIGNIFICANT CHANGE UP (ref 3.8–10.5)
WBC # FLD AUTO: 7.75 K/UL — SIGNIFICANT CHANGE UP (ref 3.8–10.5)

## 2024-08-21 PROCEDURE — 74177 CT ABD & PELVIS W/CONTRAST: CPT | Mod: 26,MC

## 2024-08-21 PROCEDURE — 99285 EMERGENCY DEPT VISIT HI MDM: CPT

## 2024-08-21 RX ORDER — ACETAMINOPHEN 500 MG/5ML
650 LIQUID (ML) ORAL ONCE
Refills: 0 | Status: COMPLETED | OUTPATIENT
Start: 2024-08-21 | End: 2024-08-21

## 2024-08-21 RX ADMIN — Medication 650 MILLIGRAM(S): at 19:53

## 2024-08-22 ENCOUNTER — APPOINTMENT (OUTPATIENT)
Dept: OBGYN | Facility: CLINIC | Age: 54
End: 2024-08-22
Payer: MEDICAID

## 2024-08-22 VITALS
WEIGHT: 220 LBS | BODY MASS INDEX: 32.58 KG/M2 | HEART RATE: 79 BPM | HEIGHT: 69 IN | SYSTOLIC BLOOD PRESSURE: 147 MMHG | DIASTOLIC BLOOD PRESSURE: 84 MMHG

## 2024-08-22 DIAGNOSIS — D25.9 LEIOMYOMA OF UTERUS, UNSPECIFIED: ICD-10-CM

## 2024-08-22 PROBLEM — Z86.2 PERSONAL HISTORY OF DISEASES OF THE BLOOD AND BLOOD-FORMING ORGANS AND CERTAIN DISORDERS INVOLVING THE IMMUNE MECHANISM: Chronic | Status: ACTIVE | Noted: 2024-07-19

## 2024-08-22 PROBLEM — S82.899A OTHER FRACTURE OF UNSPECIFIED LOWER LEG, INITIAL ENCOUNTER FOR CLOSED FRACTURE: Chronic | Status: ACTIVE | Noted: 2024-07-19

## 2024-08-22 PROCEDURE — 99024 POSTOP FOLLOW-UP VISIT: CPT

## 2024-08-22 NOTE — HISTORY OF PRESENT ILLNESS
[FreeTextEntry1] : 54 yo P2 here for second PO check after jamel TLH BS for fibroids 7/26/24. Pt presented to ED w/ abd pain localized to umb.  No n,v,c,d  Pt treated w/mupirocin.

## 2024-08-22 NOTE — PHYSICAL EXAM
[[id number]] : A chaperone was present during the pelvic exam. [FreeTextEntry7] : Umb w/superficial 1-2 mm skin separation, < 1 mm. [Normal] : normal external genitalia [FreeTextEntry4] : Cuff in tact

## 2024-08-22 NOTE — DISCUSSION/SUMMARY
[FreeTextEntry1] : 54 yo P2 here for second PO check after jamel TLH BS for fibroids 7/26/24. Pt here after ED visit for umb discomfort. CT scan shows in tact facia, questionable superficial separation. Precautions reviewed.  Plan Routine f/u prn

## 2024-08-25 PROBLEM — D25.9 LEIOMYOMA OF UTERUS, UNSPECIFIED: Chronic | Status: ACTIVE | Noted: 2024-07-19

## 2024-09-05 ENCOUNTER — APPOINTMENT (OUTPATIENT)
Dept: OBGYN | Facility: CLINIC | Age: 54
End: 2024-09-05
Payer: MEDICAID

## 2024-09-05 VITALS
WEIGHT: 217 LBS | BODY MASS INDEX: 32.14 KG/M2 | SYSTOLIC BLOOD PRESSURE: 146 MMHG | DIASTOLIC BLOOD PRESSURE: 89 MMHG | HEART RATE: 66 BPM | HEIGHT: 69 IN

## 2024-09-05 DIAGNOSIS — D25.9 LEIOMYOMA OF UTERUS, UNSPECIFIED: ICD-10-CM

## 2024-09-05 PROCEDURE — 99024 POSTOP FOLLOW-UP VISIT: CPT

## 2024-09-05 NOTE — DISCUSSION/SUMMARY
[FreeTextEntry1] : 54 yo P2 here for wound check after jamel TLH BS for fibroids 7/26/24. No sig pain at umb incision, no leakage. Incision healing by secondary intention. Precautions reviewed.  Plan Routine care prn

## 2024-09-05 NOTE — HISTORY OF PRESENT ILLNESS
[FreeTextEntry1] : 52 yo P2 here for wound check after jamel TLH BS for fibroids 7/26/24. No sig pain at umb incision, no leakage.

## 2025-01-10 NOTE — PROGRESS NOTE ADULT - SUBJECTIVE AND OBJECTIVE BOX
HCC coding opportunities       Chart reviewed, no opportunity found: CHART REVIEWED, NO OPPORTUNITY FOUND   This is a reminder to address (resolve/update/assess) ALL HCC (risk adjustment) codes as found on active problem list for 2025 as patient scores reset to zero HAILEY.     Patients Insurance        Commercial Insurance: [employer]        Patient is a 49y old  Female who presents with a chief complaint of left ankle pain (04 Feb 2020 07:03)      INTERVAL HPI: Pt seen and examined. States she feels well pain is under control when she sleeps and doesnt mood. Pt aware of OR likely Monday.     OVERNIGHT EVENTS: none noted  T(F): 98.1 (02-04-20 @ 06:00), Max: 98.6 (02-03-20 @ 23:30)  HR: 68 (02-04-20 @ 06:00) (65 - 86)  BP: 103/56 (02-04-20 @ 08:01) (103/56 - 137/65)  RR: 16 (02-04-20 @ 06:00) (16 - 18)  SpO2: 97% (02-04-20 @ 06:00) (96% - 99%)  I&O's Summary      REVIEW OF SYSTEMS:  CONSTITUTIONAL: No fever, weight loss, or fatigue  RESPIRATORY: No cough, wheezing, chills or hemoptysis; No shortness of breath  CARDIOVASCULAR: No chest pain, palpitations, dizziness, or leg swelling  GASTROINTESTINAL: No abdominal or epigastric pain. No nausea, vomiting, or hematemesis; No diarrhea or constipation. No melena or hematochezia.  GENITOURINARY: No dysuria, frequency, hematuria, or incontinence  NEUROLOGICAL: No headaches, memory loss, loss of strength, numbness, or tremors  SKIN: No itching, burning, rashes, or lesions   MUSCULOSKELETAL: No joint pain or swelling; No muscle, back, or extremity pain except LLE  PSYCHIATRIC: No depression, anxiety, mood swings, or difficulty sleeping    PHYSICAL EXAM:  GENERAL: NAD, well-groomed, well-developed  NERVOUS SYSTEM:  Alert & Oriented X3, Motor Strength 5/5 B/L upper and lower extremities except LLE 3/5, cast in place, good sensation  CHEST/LUNG: Clear to percussion bilaterally; No rales, rhonchi, wheezing, or rubs  HEART: Regular rate and rhythm; No murmurs, rubs, or gallops  ABDOMEN: Soft, Nontender, Nondistended; Bowel sounds present  EXTREMITIES:  2+ Peripheral Pulses, No clubbing, cyanosis, or edema  SKIN: No rashes or lesions    LABS:                        12.7   9.99  )-----------( 323      ( 03 Feb 2020 11:32 )             38.0     02-03    140  |  107  |  6<L>  ----------------------------<  113<H>  4.3   |  27  |  1.08    Ca    9.0      03 Feb 2020 11:32      PT/INR - ( 03 Feb 2020 11:32 )   PT: 11.6 sec;   INR: 1.03 ratio         PTT - ( 03 Feb 2020 11:32 )  PTT:33.4 sec    CAPILLARY BLOOD GLUCOSE                  MEDICATIONS  (STANDING):  heparin  Injectable 5000 Unit(s) SubCutaneous every 8 hours  multivitamin 1 Tablet(s) Oral daily  senna 2 Tablet(s) Oral at bedtime    MEDICATIONS  (PRN):  acetaminophen   Tablet .. 650 milliGRAM(s) Oral every 6 hours PRN Temp greater or equal to 38C (100.4F)  bisacodyl 5 milliGRAM(s) Oral daily PRN Constipation  HYDROmorphone  Injectable 1 milliGRAM(s) IV Push every 3 hours PRN breakthrough pain  ondansetron Injectable 4 milliGRAM(s) IV Push every 6 hours PRN Nausea  oxyCODONE    IR 10 milliGRAM(s) Oral every 4 hours PRN Severe Pain (7 - 10)  oxyCODONE    IR 5 milliGRAM(s) Oral every 4 hours PRN Moderate Pain (4 - 6)  traMADol 50 milliGRAM(s) Oral every 6 hours PRN Mild Pain (1 - 3)

## 2025-03-14 NOTE — H&P PST ADULT - NSANTHSNORERD_ENT_A_CORE
You can access the FollowMyHealth Patient Portal offered by [facility] by registering at the following website: [email or web address]. By joining [employer]’s FollowMyHealth portal, you will also be able to view your health information using other applications (apps) compatible with our system. Yes